# Patient Record
Sex: MALE | Race: BLACK OR AFRICAN AMERICAN | NOT HISPANIC OR LATINO | Employment: OTHER | URBAN - METROPOLITAN AREA
[De-identification: names, ages, dates, MRNs, and addresses within clinical notes are randomized per-mention and may not be internally consistent; named-entity substitution may affect disease eponyms.]

---

## 2017-11-10 ENCOUNTER — HOSPITAL ENCOUNTER (OUTPATIENT)
Dept: CARDIOLOGY | Facility: HOSPITAL | Age: 62
Discharge: HOME | End: 2017-11-10
Attending: ORTHOPAEDIC SURGERY | Admitting: ORTHOPAEDIC SURGERY

## 2017-11-10 ENCOUNTER — TRANSCRIBE ORDERS (OUTPATIENT)
Dept: ADMINISTRATIVE | Facility: HOSPITAL | Age: 62
End: 2017-11-10

## 2017-11-10 ENCOUNTER — LAB (OUTPATIENT)
Dept: LAB | Facility: HOSPITAL | Age: 62
End: 2017-11-10

## 2017-11-10 DIAGNOSIS — S83.206A ACUTE MENISCAL TEAR OF KNEE, RIGHT, INITIAL ENCOUNTER: ICD-10-CM

## 2017-11-10 DIAGNOSIS — Z01.811 PRE-OP CHEST EXAM: Primary | ICD-10-CM

## 2017-11-10 DIAGNOSIS — S83.206A ACUTE MENISCAL TEAR OF KNEE, RIGHT, INITIAL ENCOUNTER: Primary | ICD-10-CM

## 2017-11-10 DIAGNOSIS — Z01.811 PRE-OP CHEST EXAM: ICD-10-CM

## 2017-11-10 LAB
ALBUMIN SERPL-MCNC: 4.3 G/DL (ref 3.5–5.2)
ALBUMIN/GLOB SERPL: 1.1 G/DL
ALP SERPL-CCNC: 96 U/L (ref 39–117)
ALT SERPL W P-5'-P-CCNC: 16 U/L (ref 1–41)
ANION GAP SERPL CALCULATED.3IONS-SCNC: 14.9 MMOL/L
AST SERPL-CCNC: 16 U/L (ref 1–40)
BASOPHILS # BLD AUTO: 0.06 10*3/MM3 (ref 0–0.2)
BASOPHILS NFR BLD AUTO: 0.8 % (ref 0–1.5)
BILIRUB SERPL-MCNC: 0.4 MG/DL (ref 0.1–1.2)
BUN BLD-MCNC: 9 MG/DL (ref 8–23)
BUN/CREAT SERPL: 10.6 (ref 7–25)
CALCIUM SPEC-SCNC: 9.3 MG/DL (ref 8.6–10.5)
CHLORIDE SERPL-SCNC: 101 MMOL/L (ref 98–107)
CO2 SERPL-SCNC: 24.1 MMOL/L (ref 22–29)
CREAT BLD-MCNC: 0.85 MG/DL (ref 0.76–1.27)
DEPRECATED RDW RBC AUTO: 47.4 FL (ref 37–54)
EOSINOPHIL # BLD AUTO: 0.28 10*3/MM3 (ref 0–0.7)
EOSINOPHIL NFR BLD AUTO: 3.5 % (ref 0.3–6.2)
ERYTHROCYTE [DISTWIDTH] IN BLOOD BY AUTOMATED COUNT: 15.4 % (ref 11.5–14.5)
GFR SERPL CREATININE-BSD FRML MDRD: 111 ML/MIN/1.73
GLOBULIN UR ELPH-MCNC: 3.9 GM/DL
GLUCOSE BLD-MCNC: 228 MG/DL (ref 65–99)
HCT VFR BLD AUTO: 43.7 % (ref 40.4–52.2)
HGB BLD-MCNC: 14.3 G/DL (ref 13.7–17.6)
IMM GRANULOCYTES # BLD: 0.03 10*3/MM3 (ref 0–0.03)
IMM GRANULOCYTES NFR BLD: 0.4 % (ref 0–0.5)
LYMPHOCYTES # BLD AUTO: 2.24 10*3/MM3 (ref 0.9–4.8)
LYMPHOCYTES NFR BLD AUTO: 28.1 % (ref 19.6–45.3)
MCH RBC QN AUTO: 27.6 PG (ref 27–32.7)
MCHC RBC AUTO-ENTMCNC: 32.7 G/DL (ref 32.6–36.4)
MCV RBC AUTO: 84.2 FL (ref 79.8–96.2)
MONOCYTES # BLD AUTO: 0.49 10*3/MM3 (ref 0.2–1.2)
MONOCYTES NFR BLD AUTO: 6.1 % (ref 5–12)
NEUTROPHILS # BLD AUTO: 4.88 10*3/MM3 (ref 1.9–8.1)
NEUTROPHILS NFR BLD AUTO: 61.1 % (ref 42.7–76)
PLATELET # BLD AUTO: 319 10*3/MM3 (ref 140–500)
PMV BLD AUTO: 10.3 FL (ref 6–12)
POTASSIUM BLD-SCNC: 4.1 MMOL/L (ref 3.5–5.2)
PROT SERPL-MCNC: 8.2 G/DL (ref 6–8.5)
RBC # BLD AUTO: 5.19 10*6/MM3 (ref 4.6–6)
SODIUM BLD-SCNC: 140 MMOL/L (ref 136–145)
WBC NRBC COR # BLD: 7.98 10*3/MM3 (ref 4.5–10.7)

## 2017-11-10 PROCEDURE — 85025 COMPLETE CBC W/AUTO DIFF WBC: CPT

## 2017-11-10 PROCEDURE — 80053 COMPREHEN METABOLIC PANEL: CPT

## 2017-11-10 PROCEDURE — 93005 ELECTROCARDIOGRAM TRACING: CPT | Performed by: ORTHOPAEDIC SURGERY

## 2017-11-10 PROCEDURE — 93010 ELECTROCARDIOGRAM REPORT: CPT | Performed by: INTERNAL MEDICINE

## 2017-11-10 PROCEDURE — 36415 COLL VENOUS BLD VENIPUNCTURE: CPT

## 2018-08-01 ENCOUNTER — HOSPITAL ENCOUNTER (EMERGENCY)
Facility: HOSPITAL | Age: 63
Discharge: PSYCHIATRIC OR SUBSTANCE USE FACILITY | End: 2018-08-02
Attending: EMERGENCY MEDICINE | Admitting: EMERGENCY MEDICINE

## 2018-08-01 DIAGNOSIS — R45.851 SUICIDAL IDEATIONS: ICD-10-CM

## 2018-08-01 DIAGNOSIS — F32.A DEPRESSION, UNSPECIFIED DEPRESSION TYPE: Primary | ICD-10-CM

## 2018-08-01 DIAGNOSIS — F10.920 ALCOHOLIC INTOXICATION WITHOUT COMPLICATION (HCC): ICD-10-CM

## 2018-08-01 PROCEDURE — 99284 EMERGENCY DEPT VISIT MOD MDM: CPT

## 2018-08-01 RX ORDER — LISINOPRIL 5 MG/1
5 TABLET ORAL DAILY
COMMUNITY
Start: 2017-05-22

## 2018-08-01 RX ORDER — METOPROLOL TARTRATE 50 MG/1
50 TABLET, FILM COATED ORAL EVERY 12 HOURS SCHEDULED
COMMUNITY

## 2018-08-01 RX ORDER — GABAPENTIN 800 MG/1
800 TABLET ORAL 3 TIMES DAILY
COMMUNITY

## 2018-08-01 RX ORDER — INSULIN GLARGINE 100 [IU]/ML
10 INJECTION, SOLUTION SUBCUTANEOUS NIGHTLY
COMMUNITY
Start: 2017-04-25

## 2018-08-01 RX ORDER — HYDROCHLOROTHIAZIDE 25 MG/1
12.5 TABLET ORAL 2 TIMES DAILY
COMMUNITY

## 2018-08-01 RX ORDER — CLOPIDOGREL BISULFATE 75 MG/1
75 TABLET ORAL DAILY
COMMUNITY
Start: 2017-04-26

## 2018-08-01 RX ORDER — AMLODIPINE BESYLATE 10 MG/1
10 TABLET ORAL DAILY
COMMUNITY

## 2018-08-01 RX ORDER — NITROGLYCERIN 0.4 MG/1
0.4 TABLET SUBLINGUAL
COMMUNITY
Start: 2017-04-25

## 2018-08-01 RX ORDER — ISOSORBIDE MONONITRATE 30 MG/1
30 TABLET, EXTENDED RELEASE ORAL DAILY
COMMUNITY

## 2018-08-02 ENCOUNTER — HOSPITAL ENCOUNTER (INPATIENT)
Facility: HOSPITAL | Age: 63
LOS: 4 days | Discharge: HOME | End: 2018-08-06
Attending: SPECIALIST | Admitting: SPECIALIST

## 2018-08-02 ENCOUNTER — APPOINTMENT (OUTPATIENT)
Dept: GENERAL RADIOLOGY | Facility: HOSPITAL | Age: 63
End: 2018-08-02
Attending: INTERNAL MEDICINE

## 2018-08-02 VITALS
BODY MASS INDEX: 27.28 KG/M2 | RESPIRATION RATE: 16 BRPM | HEIGHT: 68 IN | SYSTOLIC BLOOD PRESSURE: 135 MMHG | OXYGEN SATURATION: 98 % | DIASTOLIC BLOOD PRESSURE: 95 MMHG | TEMPERATURE: 98.7 F | WEIGHT: 180 LBS | HEART RATE: 95 BPM

## 2018-08-02 PROBLEM — R45.851 DEPRESSION WITH SUICIDAL IDEATION: Status: ACTIVE | Noted: 2018-08-02

## 2018-08-02 PROBLEM — F32.A DEPRESSION WITH SUICIDAL IDEATION: Status: ACTIVE | Noted: 2018-08-02

## 2018-08-02 LAB
ALBUMIN SERPL-MCNC: 4.4 G/DL (ref 3.5–5.2)
ALBUMIN/GLOB SERPL: 1.4 G/DL
ALP SERPL-CCNC: 90 U/L (ref 39–117)
ALT SERPL W P-5'-P-CCNC: 20 U/L (ref 1–41)
AMPHET+METHAMPHET UR QL: NEGATIVE
ANION GAP SERPL CALCULATED.3IONS-SCNC: 21.5 MMOL/L
AST SERPL-CCNC: 19 U/L (ref 1–40)
BARBITURATES UR QL SCN: NEGATIVE
BASOPHILS # BLD AUTO: 0.03 10*3/MM3 (ref 0–0.2)
BASOPHILS NFR BLD AUTO: 0.4 % (ref 0–1.5)
BENZODIAZ UR QL SCN: NEGATIVE
BILIRUB SERPL-MCNC: 0.2 MG/DL (ref 0.1–1.2)
BUN BLD-MCNC: 13 MG/DL (ref 8–23)
BUN/CREAT SERPL: 13 (ref 7–25)
CALCIUM SPEC-SCNC: 9.3 MG/DL (ref 8.6–10.5)
CANNABINOIDS SERPL QL: NEGATIVE
CHLORIDE SERPL-SCNC: 97 MMOL/L (ref 98–107)
CO2 SERPL-SCNC: 19.5 MMOL/L (ref 22–29)
COCAINE UR QL: NEGATIVE
CREAT BLD-MCNC: 1 MG/DL (ref 0.76–1.27)
DEPRECATED RDW RBC AUTO: 45.5 FL (ref 37–54)
EOSINOPHIL # BLD AUTO: 0.25 10*3/MM3 (ref 0–0.7)
EOSINOPHIL NFR BLD AUTO: 3.2 % (ref 0.3–6.2)
ERYTHROCYTE [DISTWIDTH] IN BLOOD BY AUTOMATED COUNT: 14.8 % (ref 11.5–14.5)
ETHANOL BLD-MCNC: 141 MG/DL (ref 0–10)
ETHANOL BLD-MCNC: <10 MG/DL (ref 0–10)
ETHANOL UR QL: 0.14 %
ETHANOL UR QL: <0.01 %
GFR SERPL CREATININE-BSD FRML MDRD: 92 ML/MIN/1.73
GLOBULIN UR ELPH-MCNC: 3.2 GM/DL
GLUCOSE BLD-MCNC: 329 MG/DL (ref 65–99)
GLUCOSE BLDC GLUCOMTR-MCNC: 232 MG/DL (ref 70–130)
GLUCOSE BLDC GLUCOMTR-MCNC: 236 MG/DL (ref 70–130)
GLUCOSE BLDC GLUCOMTR-MCNC: 239 MG/DL (ref 70–130)
GLUCOSE BLDC GLUCOMTR-MCNC: 248 MG/DL (ref 70–130)
GLUCOSE BLDC GLUCOMTR-MCNC: 248 MG/DL (ref 70–130)
GLUCOSE BLDC GLUCOMTR-MCNC: 304 MG/DL (ref 70–130)
HCT VFR BLD AUTO: 46.3 % (ref 40.4–52.2)
HGB BLD-MCNC: 14.3 G/DL (ref 13.7–17.6)
IMM GRANULOCYTES # BLD: 0 10*3/MM3 (ref 0–0.03)
IMM GRANULOCYTES NFR BLD: 0 % (ref 0–0.5)
LYMPHOCYTES # BLD AUTO: 2.21 10*3/MM3 (ref 0.9–4.8)
LYMPHOCYTES NFR BLD AUTO: 28.3 % (ref 19.6–45.3)
MCH RBC QN AUTO: 26 PG (ref 27–32.7)
MCHC RBC AUTO-ENTMCNC: 30.9 G/DL (ref 32.6–36.4)
MCV RBC AUTO: 84.3 FL (ref 79.8–96.2)
METHADONE UR QL SCN: NEGATIVE
MONOCYTES # BLD AUTO: 0.6 10*3/MM3 (ref 0.2–1.2)
MONOCYTES NFR BLD AUTO: 7.7 % (ref 5–12)
NEUTROPHILS # BLD AUTO: 4.73 10*3/MM3 (ref 1.9–8.1)
NEUTROPHILS NFR BLD AUTO: 60.4 % (ref 42.7–76)
OPIATES UR QL: NEGATIVE
OXYCODONE UR QL SCN: NEGATIVE
PLATELET # BLD AUTO: 232 10*3/MM3 (ref 140–500)
PMV BLD AUTO: 10.9 FL (ref 6–12)
POTASSIUM BLD-SCNC: 4 MMOL/L (ref 3.5–5.2)
PROT SERPL-MCNC: 7.6 G/DL (ref 6–8.5)
RBC # BLD AUTO: 5.49 10*6/MM3 (ref 4.6–6)
SODIUM BLD-SCNC: 138 MMOL/L (ref 136–145)
T4 FREE SERPL-MCNC: 1.32 NG/DL (ref 0.93–1.7)
TSH SERPL DL<=0.05 MIU/L-ACNC: 2.97 MIU/ML (ref 0.27–4.2)
WBC NRBC COR # BLD: 7.82 10*3/MM3 (ref 4.5–10.7)

## 2018-08-02 PROCEDURE — 82962 GLUCOSE BLOOD TEST: CPT

## 2018-08-02 PROCEDURE — 84439 ASSAY OF FREE THYROXINE: CPT | Performed by: SPECIALIST

## 2018-08-02 PROCEDURE — 80307 DRUG TEST PRSMV CHEM ANLYZR: CPT | Performed by: EMERGENCY MEDICINE

## 2018-08-02 PROCEDURE — 80053 COMPREHEN METABOLIC PANEL: CPT | Performed by: SPECIALIST

## 2018-08-02 PROCEDURE — 63710000001 INSULIN ASPART PER 5 UNITS: Performed by: EMERGENCY MEDICINE

## 2018-08-02 PROCEDURE — 73502 X-RAY EXAM HIP UNI 2-3 VIEWS: CPT

## 2018-08-02 PROCEDURE — 63710000001 INSULIN DETEMIR PER 5 UNITS: Performed by: INTERNAL MEDICINE

## 2018-08-02 PROCEDURE — 63710000001 INSULIN ASPART PER 5 UNITS: Performed by: SPECIALIST

## 2018-08-02 PROCEDURE — 85025 COMPLETE CBC W/AUTO DIFF WBC: CPT | Performed by: SPECIALIST

## 2018-08-02 PROCEDURE — 84443 ASSAY THYROID STIM HORMONE: CPT | Performed by: SPECIALIST

## 2018-08-02 RX ORDER — LISINOPRIL 5 MG/1
5 TABLET ORAL
Status: DISPENSED | OUTPATIENT
Start: 2018-08-02 | End: 2018-08-03

## 2018-08-02 RX ORDER — AMLODIPINE BESYLATE 10 MG/1
10 TABLET ORAL NIGHTLY
Status: COMPLETED | OUTPATIENT
Start: 2018-08-02 | End: 2018-08-02

## 2018-08-02 RX ORDER — ASPIRIN 81 MG/1
81 TABLET, CHEWABLE ORAL DAILY
Status: DISCONTINUED | OUTPATIENT
Start: 2018-08-02 | End: 2018-08-03

## 2018-08-02 RX ORDER — PRAVASTATIN SODIUM 40 MG
40 TABLET ORAL 2 TIMES DAILY
COMMUNITY
End: 2018-08-06 | Stop reason: HOSPADM

## 2018-08-02 RX ORDER — METOPROLOL TARTRATE 50 MG/1
50 TABLET, FILM COATED ORAL EVERY 12 HOURS SCHEDULED
Status: DISPENSED | OUTPATIENT
Start: 2018-08-02 | End: 2018-08-03

## 2018-08-02 RX ORDER — DEXTROSE MONOHYDRATE 25 G/50ML
25 INJECTION, SOLUTION INTRAVENOUS
Status: DISCONTINUED | OUTPATIENT
Start: 2018-08-02 | End: 2018-08-06 | Stop reason: HOSPADM

## 2018-08-02 RX ORDER — ARIPIPRAZOLE 10 MG/1
10 TABLET ORAL DAILY
COMMUNITY
End: 2018-08-06 | Stop reason: HOSPADM

## 2018-08-02 RX ORDER — ARIPIPRAZOLE 10 MG/1
10 TABLET ORAL DAILY
Status: COMPLETED | OUTPATIENT
Start: 2018-08-02 | End: 2018-08-03

## 2018-08-02 RX ORDER — NICOTINE POLACRILEX 4 MG
15 LOZENGE BUCCAL
Status: DISCONTINUED | OUTPATIENT
Start: 2018-08-02 | End: 2018-08-06 | Stop reason: HOSPADM

## 2018-08-02 RX ORDER — GABAPENTIN 400 MG/1
800 CAPSULE ORAL 3 TIMES DAILY
Status: COMPLETED | OUTPATIENT
Start: 2018-08-02 | End: 2018-08-03

## 2018-08-02 RX ORDER — ACETAMINOPHEN 325 MG/1
650 TABLET ORAL EVERY 4 HOURS PRN
Status: DISCONTINUED | OUTPATIENT
Start: 2018-08-02 | End: 2018-08-06 | Stop reason: HOSPADM

## 2018-08-02 RX ORDER — NAPROXEN 500 MG/1
500 TABLET ORAL 2 TIMES DAILY WITH MEALS
Status: DISCONTINUED | OUTPATIENT
Start: 2018-08-02 | End: 2018-08-06 | Stop reason: HOSPADM

## 2018-08-02 RX ORDER — ALUMINA, MAGNESIA, AND SIMETHICONE 2400; 2400; 240 MG/30ML; MG/30ML; MG/30ML
15 SUSPENSION ORAL EVERY 6 HOURS PRN
Status: DISCONTINUED | OUTPATIENT
Start: 2018-08-02 | End: 2018-08-06 | Stop reason: HOSPADM

## 2018-08-02 RX ORDER — CLONIDINE HYDROCHLORIDE 0.1 MG/1
0.1 TABLET ORAL ONCE
Status: COMPLETED | OUTPATIENT
Start: 2018-08-02 | End: 2018-08-02

## 2018-08-02 RX ORDER — CHLORDIAZEPOXIDE HYDROCHLORIDE 25 MG/1
25 CAPSULE, GELATIN COATED ORAL ONCE
Status: COMPLETED | OUTPATIENT
Start: 2018-08-02 | End: 2018-08-02

## 2018-08-02 RX ORDER — HYDROCHLOROTHIAZIDE 12.5 MG/1
12.5 CAPSULE, GELATIN COATED ORAL 2 TIMES DAILY
Status: DISPENSED | OUTPATIENT
Start: 2018-08-02 | End: 2018-08-03

## 2018-08-02 RX ORDER — ISOSORBIDE MONONITRATE 30 MG/1
30 TABLET, EXTENDED RELEASE ORAL NIGHTLY
Status: COMPLETED | OUTPATIENT
Start: 2018-08-02 | End: 2018-08-02

## 2018-08-02 RX ORDER — CLOPIDOGREL BISULFATE 75 MG/1
75 TABLET ORAL DAILY
Status: DISCONTINUED | OUTPATIENT
Start: 2018-08-02 | End: 2018-08-03

## 2018-08-02 RX ADMIN — INSULIN ASPART 5 UNITS: 100 INJECTION, SOLUTION INTRAVENOUS; SUBCUTANEOUS at 00:11

## 2018-08-02 RX ADMIN — INSULIN DETEMIR 15 UNITS: 100 INJECTION, SOLUTION SUBCUTANEOUS at 23:10

## 2018-08-02 RX ADMIN — CLONIDINE HYDROCHLORIDE 0.1 MG: 0.1 TABLET ORAL at 06:35

## 2018-08-02 RX ADMIN — AMLODIPINE BESYLATE 10 MG: 10 TABLET ORAL at 21:22

## 2018-08-02 RX ADMIN — NAPROXEN 500 MG: 500 TABLET ORAL at 21:21

## 2018-08-02 RX ADMIN — ISOSORBIDE MONONITRATE 30 MG: 30 TABLET ORAL at 21:22

## 2018-08-02 RX ADMIN — CHLORDIAZEPOXIDE HYDROCHLORIDE 25 MG: 25 CAPSULE ORAL at 06:35

## 2018-08-02 RX ADMIN — ARIPIPRAZOLE 10 MG: 10 TABLET ORAL at 15:10

## 2018-08-02 RX ADMIN — GABAPENTIN 800 MG: 400 CAPSULE ORAL at 15:09

## 2018-08-02 RX ADMIN — METOPROLOL TARTRATE 50 MG: 50 TABLET, FILM COATED ORAL at 21:21

## 2018-08-02 RX ADMIN — GABAPENTIN 800 MG: 400 CAPSULE ORAL at 21:22

## 2018-08-02 RX ADMIN — INSULIN ASPART 5 UNITS: 100 INJECTION, SOLUTION INTRAVENOUS; SUBCUTANEOUS at 17:22

## 2018-08-02 RX ADMIN — INSULIN ASPART 7 UNITS: 100 INJECTION, SOLUTION INTRAVENOUS; SUBCUTANEOUS at 11:02

## 2018-08-02 NOTE — ED NOTES
PT resting comfortably with sitter at bedside. Cafeteria contacted about breakfast tray.      Aniyah Garrett RN  08/02/18 5859

## 2018-08-02 NOTE — ED NOTES
Accu-chek completed. No Change: Glucose 248mg/dL. Nurse Cristiane Haque notified       Isidro Frankel  08/02/18 0103

## 2018-08-02 NOTE — CONSULTS
Inpatient Hospitalist Consult  Consult performed by: JE HENSON  Consult ordered by: ROBERT NICE III        Date of Admit: 8/2/2018  Date of Consult: 08/03/18    Requesting Physician: Dr. Nice    Reason for Consult: Medical evaluation contributing to current psychiatric illness and management of diabetes and hypertension    Patient is a 63 y.o. male admitted to the Crisis Management Unit for further evaluation regarding depression and suicidal ideation.  We were asked to see and evaluate his medical issues as they may pertain to his current psychiatric illness.  Patient currently denies any chest pain, shortness of breath, nausea or vomiting.  No recent illnesses.  Sugars have been high, in the mid 200s.  No polyuria or polydipsia.  He has gained about 15 pounds in the last few months.  No paresthesias.  No dizziness or lightheadedness.  No other associated symptoms or exacerbating or alleviating factors    Past Medical History:   Diagnosis Date   • Bipolar 1 disorder (CMS/HCC)    • Hyperlipidemia    • Hypertension    • Myocardial infarction    • Stroke (CMS/HCC)     DM2    Past Surgical History:   Procedure Laterality Date   • CARDIAC SURGERY     • KNEE ARTHROPLASTY         Prescriptions Prior to Admission   Medication Sig Dispense Refill Last Dose   • amLODIPine (NORVASC) 10 MG tablet Take 10 mg by mouth Daily.   Past Week at Unknown time   • ARIPiprazole (ABILIFY) 10 MG tablet Take 10 mg by mouth Daily.   Past Week at Unknown time   • aspirin 81 MG tablet Take 81 mg by mouth Daily.   Past Week at Unknown time   • clopidogrel (PLAVIX) 75 MG tablet Take 75 mg by mouth Daily.   Past Week at Unknown time   • gabapentin (NEURONTIN) 800 MG tablet Take 800 mg by mouth 3 (Three) Times a Day.   Past Week at Unknown time   • hydrochlorothiazide (HYDRODIURIL) 25 MG tablet Take 12.5 mg by mouth 2 (Two) Times a Day.   Past Week at Unknown time   • insulin aspart (novoLOG) 100 UNIT/ML injection  Inject 5 Units under the skin into the appropriate area as directed 3 (Three) Times a Day Before Meals. Blood glucose less than 150, hold that dose of insulin, takes 5 units if blood glucose is 150-200; if above 200 takes 6 units, then adds 1 additional unit for each 50 point increase in BG.   Past Week at Unknown time   • insulin glargine (LANTUS) 100 UNIT/ML injection Inject 10 Units under the skin into the appropriate area as directed Every Night.   Past Week at Unknown time   • isosorbide mononitrate (IMDUR) 30 MG 24 hr tablet Take 30 mg by mouth Daily.   Past Week at Unknown time   • lisinopril (PRINIVIL,ZESTRIL) 5 MG tablet Take 5 mg by mouth Daily.   Past Week at Unknown time   • metoprolol tartrate (LOPRESSOR) 50 MG tablet Take 50 mg by mouth Every 12 (Twelve) Hours.   Past Week at Unknown time   • nitroglycerin (NITROSTAT) 0.4 MG SL tablet Place 0.4 mg under the tongue Every 5 (Five) Minutes As Needed for Chest Pain.   More than a month at Unknown time   • pravastatin (PRAVACHOL) 40 MG tablet Take 40 mg by mouth 2 (Two) Times a Day.        Allergies:  Ticagrelor; Statins; Carvedilol; and Hydralazine    Social History   Substance Use Topics   • Smoking status: Current Every Day Smoker     Packs/day: 0.50   • Smokeless tobacco: Not on file   • Alcohol use 14.4 oz/week     24 Cans of beer per week      Comment: daily       Family history  Positive for diabetes, heart disease, stroke, hypertension    Review of Systems   Constitutional: Positive for unexpected weight change. Negative for activity change, appetite change, chills, fatigue and fever.   HENT: Negative for congestion and sore throat.    Eyes: Negative for visual disturbance.   Respiratory: Negative for cough, choking, shortness of breath and wheezing.    Cardiovascular: Negative for chest pain, palpitations and leg swelling.   Gastrointestinal: Negative for abdominal distention, constipation, diarrhea and vomiting.   Endocrine: Negative for  polydipsia, polyphagia and polyuria.   Genitourinary: Negative for difficulty urinating and dysuria.   Musculoskeletal: Positive for arthralgias.        Left hip has been hurting for the last 2 months.  It's constant.  Sometimes it gets better with heat.  It's very severe when he first wakes up in the morning.  Walking doesn't seem to make it any worse.   Skin: Negative for rash and wound.   Allergic/Immunologic: Negative for environmental allergies.   Neurological: Negative for dizziness, weakness and numbness.   Psychiatric/Behavioral: Positive for dysphoric mood and suicidal ideas.          Vital Signs  Temp:  [97.7 °F (36.5 °C)] 97.7 °F (36.5 °C)  Heart Rate:  [79] 79  Resp:  [20] 20  BP: (141)/(103) 141/103  Body mass index is 27.37 kg/m².    Physical Exam   Constitutional: He appears well-developed and well-nourished. No distress.   HENT:   Head: Normocephalic and atraumatic.   Right Ear: External ear normal.   Left Ear: External ear normal.   Nose: Nose normal.   Mouth/Throat: Oropharynx is clear and moist. No oropharyngeal exudate.   Eyes: Pupils are equal, round, and reactive to light. Conjunctivae and EOM are normal. Right eye exhibits no discharge. Left eye exhibits no discharge. No scleral icterus.   Neck: Normal range of motion. Neck supple. No JVD present. No tracheal deviation present. No thyromegaly present.   Cardiovascular: Normal rate, regular rhythm, normal heart sounds and intact distal pulses.    No murmur heard.  Pulmonary/Chest: Effort normal and breath sounds normal. No stridor. No respiratory distress. He has no wheezes. He has no rales.   Abdominal: Soft. Bowel sounds are normal. He exhibits no distension. There is no tenderness.   No hepatosplenomegaly   Musculoskeletal: He exhibits no edema or deformity.   Mild tenderness at left lateral hip.  Some decreased internal and external rotation left hip   Lymphadenopathy:     He has no cervical adenopathy.   Neurological: He is alert. A  sensory deficit is present. No cranial nerve deficit.   Hypesthesia distal lower extremities   Skin: Skin is warm and dry. He is not diaphoretic. No erythema.   Psychiatric: His behavior is normal.   Flat affect   Nursing note and vitals reviewed.      Results Review:    I reviewed the patient's new clinical results.    Lab Results   Component Value Date    GLUCOSE 329 (H) 08/02/2018    BUN 13 08/02/2018    CREATININE 1.00 08/02/2018    EGFRIFAFRI 92 08/02/2018    BCR 13.0 08/02/2018    K 4.0 08/02/2018    CO2 19.5 (L) 08/02/2018    CALCIUM 9.3 08/02/2018    ALBUMIN 4.40 08/02/2018    AST 19 08/02/2018    ALT 20 08/02/2018       Lab Results   Component Value Date    WBC 7.82 08/02/2018    HGB 14.3 08/02/2018    HCT 46.3 08/02/2018    MCV 84.3 08/02/2018     08/02/2018             Imaging Results (last 24 hours)     Procedure Component Value Units Date/Time    XR Hip With or Without Pelvis 2 - 3 View Left [[id number]] Collected:  08/03/18 0542     Updated:  08/03/18 0545    Narrative:       THREE-VIEW LEFT HIP     HISTORY: left hip pain     FINDINGS: Three views of the left hip were submitted. There is no  fracture or dislocation. There are multiple small pelvic calcifications  present.  These likely reflect phleboliths.  If there is concern  otherwise or suspicion of ureteral calculus, CT imaging may be  indicated..             Impression:       1. No acute osseous abnormality.     This report was finalized on 8/3/2018 5:42 AM by Erwin Nichols M.D.               Assessment/Plan:  1.  Diabetes mellitus type 2.  Uncontrolled.  Continue mealtime insulin and Levemir.  Sliding scale as needed.  A1c 9.6 June 2018  2.  Hypertension.  Continue Lopressor.  Also has history of coronary artery disease  3.  Left hip pain: Osteoarthritis versus trochanteric bursitis.  Plain film of the hip ordered.  I ordered Naprosyn to see if he gets some relief.  Ice to the area  4.  History of TIA.  Intolerant of statins.  Continue  aspirin and Plavix  5.  Bipolar disorder with alcoholism, depression and suicidal ideation.  Management per psychiatry    Marisol Subramanian MD  [facility]  08/03/18  10:45 AM

## 2018-08-02 NOTE — ED PROVIDER NOTES
" EMERGENCY DEPARTMENT ENCOUNTER    CHIEF COMPLAINT  Chief Complaint: suicidal ideations  History given by: patient  History limited by: none  Room Number: 22/22  PMD: Hector Araya, APRN      HPI:  Pt is a 63 y.o. male who presents complaining of feelings of depression w/ hx of bipolar depression and previous suicide attempts. Pt advised that he developed sx after a significant financial issues arose. Pt admitted to having thoughts of taking a llarge amount of gabapentin. He contacted the crisis hotline and they sent the police to the house. Denies illicit drug use. Admits to ETOH use (last consumption earlier tonight, \"two beers\"). Pt reports that he feels improved, but still has some vague SI.    Duration: pta  Onset: gradual  Timing: constant  Location: n/a  Radiation: n/a  Quality: SI  Intensity/Severity: moderate  Progression: improved  Associated Symptoms: none  Aggravating Factors: pt reports significant financial burdens resulting in the suspension of his 's license.  Alleviating Factors: none  Previous Episodes: pt has hx of bipolar depression and previous suicide attempts  Treatment before arrival: none    PAST MEDICAL HISTORY  Active Ambulatory Problems     Diagnosis Date Noted   • No Active Ambulatory Problems     Resolved Ambulatory Problems     Diagnosis Date Noted   • No Resolved Ambulatory Problems     Past Medical History:   Diagnosis Date   • Bipolar 1 disorder (CMS/HCC)    • Hyperlipidemia    • Hypertension    • Myocardial infarction    • Stroke (CMS/HCC)        PAST SURGICAL HISTORY  Past Surgical History:   Procedure Laterality Date   • CARDIAC SURGERY     • KNEE ARTHROPLASTY         FAMILY HISTORY  History reviewed. No pertinent family history.    SOCIAL HISTORY  Social History     Social History   • Marital status:      Spouse name: N/A   • Number of children: N/A   • Years of education: N/A     Occupational History   • Not on file.     Social History Main Topics   • Smoking " status: Current Every Day Smoker     Packs/day: 0.50   • Smokeless tobacco: Not on file   • Alcohol use 14.4 oz/week     24 Cans of beer per week      Comment: daily   • Drug use: No   • Sexual activity: Not on file     Other Topics Concern   • Not on file     Social History Narrative   • No narrative on file       ALLERGIES  Ticagrelor; Statins; Carvedilol; and Hydralazine    REVIEW OF SYSTEMS  Review of Systems   Constitutional: Negative for fever.   Respiratory: Negative for shortness of breath.    Cardiovascular: Negative for chest pain.   Psychiatric/Behavioral: Positive for suicidal ideas.       PHYSICAL EXAM  ED Triage Vitals   Temp Heart Rate Resp BP SpO2   -- 08/01/18 2308 08/01/18 2308 08/01/18 2308 08/01/18 2314    94 18 146/100 98 %      Temp src Heart Rate Source Patient Position BP Location FiO2 (%)   -- -- 08/01/18 2308 08/01/18 2308 --     Sitting Right arm        Physical Exam   Constitutional: No distress.   HENT:   Head: Normocephalic and atraumatic.   Eyes: EOM are normal.   Neck: Normal range of motion.   Pulmonary/Chest: No respiratory distress.   Abdominal: There is no tenderness.   Musculoskeletal: He exhibits no edema.   Neurological: He is alert.   Skin: Skin is warm and dry.   Psychiatric:   Pt continues to have some vague SI   Nursing note and vitals reviewed.      LAB RESULTS  Lab Results (last 24 hours)     Procedure Component Value Units Date/Time    POC Glucose Once [[id number]]  (Abnormal) Collected:  08/02/18 0008    Specimen:  Blood Updated:  08/02/18 0009     Glucose 248 (H) mg/dL     Narrative:       Meter: [id number] : [id number] Jostin BENÍTEZ [facility]    Urine Drug Screen - Urine, Clean Catch [[id number]]  (Normal) Collected:  08/02/18 0012    Specimen:  Urine from Urine, Clean Catch Updated:  08/02/18 0047     Amphet/Methamphet, Screen Negative     Barbiturates Screen, Urine Negative     Benzodiazepine Screen, Urine Negative     Cocaine Screen, Urine Negative     Opiate  Screen Negative     THC, Screen, Urine Negative     Methadone Screen, Urine Negative     Oxycodone Screen, Urine Negative    Narrative:       Negative Thresholds For Drugs Screened:     Amphetamines               500 ng/ml   Barbiturates               200 ng/ml   Benzodiazepines            100 ng/ml   Cocaine                    300 ng/ml   Methadone                  300 ng/ml   Opiates                    300 ng/ml   Oxycodone                  100 ng/ml   THC                        50 ng/ml    The Normal Value for all drugs tested is negative. This report includes final unconfirmed screening results to be used for medical treatment purposes only. Unconfirmed results must not be used for non-medical purposes such as employment or legal testing. Clinical consideration should be applied to any drug of abuse test, particulary when unconfirmed results are used.    Ethanol [[id number]]  (Abnormal) Collected:  08/02/18 0025    Specimen:  Blood from Arm, Right Updated:  08/02/18 0050     Ethanol 141 (H) mg/dL      Ethanol % 0.141 %     POC Glucose Once [[id number]]  (Abnormal) Collected:  08/02/18 0059    Specimen:  Blood Updated:  08/02/18 0101     Glucose 248 (H) mg/dL     Narrative:       Meter: [id number] : [id number] Jostin [facility]    POC Glucose Once [[id number]]  (Abnormal) Collected:  08/02/18 0304    Specimen:  Blood Updated:  08/02/18 0316     Glucose 239 (H) mg/dL     Narrative:       Meter: [id number] : [id number] Roque GALE          I ordered the above labs and reviewed the results    PROCEDURES  Procedures      PROGRESS AND CONSULTS     0100  Discussed the pt with Elba Eng RN. He will wait until 0200 to assess the pt due to ETOH level.    0321  Discussed pt's behavioral health evaluation w/ Elba Eng RN. After speaking to the pt and his wife, he is concerned for the pt and will find placement/admission.Dr. Sevilla will take over his care in the emergency department until placement is  found.    MEDICAL DECISION MAKING  Results were reviewed/discussed with the patient and they were also made aware of online access. Pt also made aware that some labs, such as cultures, will not be resulted during ER visit and follow up with PMD is necessary.     MDM  Number of Diagnoses or Management Options  Alcoholic intoxication without complication (CMS/HCC):   Depression, unspecified depression type:   Suicidal ideations:      Amount and/or Complexity of Data Reviewed  Clinical lab tests: reviewed (UDS-negative, ETOH 141)  Discuss the patient with other providers: yes (Elba Eng RN)    Patient Progress  Patient progress: stable         DIAGNOSIS  Final diagnoses:   Depression, unspecified depression type   Suicidal ideations   Alcoholic intoxication without complication (CMS/HCC)       DISPOSITION  ADMISSION    Discussed treatment plan and reason for admission with pt/family and admitting physician.  Pt/family voiced understanding of the plan for admission for further testing/treatment as needed.     Latest Documented Vital Signs:  As of 3:42 AM  BP- 146/100 HR- 94 Temp- 97.5 °F (36.4 °C) (Tympanic) O2 sat- 98%    --  Documentation assistance provided by jesika Schaffer for Dr. Gilmore.  Information recorded by the scribe was done at my direction and has been verified and validated by me.     Tamara Schaffer  08/02/18 9532       Ajit Gilmore MD  08/02/18 4132

## 2018-08-02 NOTE — PLAN OF CARE
"Problem: Patient Care Overview  Goal: Plan of Care Review  Outcome: Ongoing (interventions implemented as appropriate)   08/02/18 1245 08/02/18 1624   OTHER   Outcome Summary --  Pt arrived on unit at 1239. He denies SI at this time, but remains 1:1 with SP until seen by MD in the morning. He is irritable, not wanting to participate in assessment. He says to himself, \"this is bullshit\" and \"I never should have come here\". He didn't want to take his BP meds r/t them being offered in the afternoon once he arrived on the unit, even though his blood pressure was 141/100 at the time. He was compliant with his neurotin, abilify and accucheck. He has stayed to self and in room for most of shift. Will continue to monitor pt for safety and any change in condition.   Plan of Care Review   Progress --  no change   Coping/Psychosocial   Plan of Care Reviewed With patient --    Coping/Psychosocial   Patient Agreement with Plan of Care disagrees (describe)  (I shouldnt be here) --        Problem: Overarching Goals (Adult)  Goal: Adheres to Safety Considerations for Self and Others  Outcome: Ongoing (interventions implemented as appropriate)   08/02/18 1624   Overarching Goals (Adult)   Adheres to Safety Considerations for Self and Others making progress toward outcome     Intervention: Develop and Maintain Individualized Safety Plan   08/02/18 1245 08/02/18 1600   C-SSRS (Recent)   Wish to be Dead yes --    Suicidal Thoughts yes --    Suicidal Thought with Method No Plan/Intent yes --    Suicidal Intent (without Specific Plan) no --    Suicide Intent with Specific Plan yes --    Describe Plan (Suicide Intent) no --    Suicide Behavior yes --    Describe Actions (Suicidal Behavior) within the last three months --    Violence Risk   Feels Like Hurting Others no --    Previous Attempt to Harm Others no --    Develop and Maintain Individualized Safety Plan   Safety Measures --  safety rounds completed       Goal: Optimized Coping " Skills in Response to Life Stressors  Outcome: Ongoing (interventions implemented as appropriate)   08/02/18 1624   Overarching Goals (Adult)   Optimized Coping Skills in Response to Life Stressors making progress toward outcome     Intervention: Promote Effective Coping Strategies   08/02/18 1624   Coping/Psychosocial Interventions   Supportive Measures active listening utilized;positive reinforcement provided;verbalization of feelings encouraged;self-responsibility promoted       Goal: Develops/Participates in Therapeutic [place] to Support Successful Transition  Outcome: Ongoing (interventions implemented as appropriate)   08/02/18 1624   Overarching Goals (Adult)   Develops/Participates in Therapeutic [place] to Support Successful Transition making progress toward outcome     Intervention: Foster Therapeutic [place]   08/02/18 1245   Interventions   Trust Relationship/Rapport care explained;choices provided;thoughts/feelings acknowledged;reassurance provided;questions encouraged     Intervention: Mutually Develop Transition Plan   08/02/18 1245   Mutually Develop Transition Plan   Transition Support crisis management plan promoted         Problem: Suicidal Behavior (Adult)  Goal: Suicidal Behavior is Absent/Minimized/Managed  Outcome: Ongoing (interventions implemented as appropriate)   08/02/18 1624   OTHER   Action Step/Short Term Goal (STG) Established 08/02/18   Suicidal Behavior is Absent/Minimized/Managed   Suicidal Behavior Managed/Minimized Time Frame for Action Step (STG) 4 days   Suicidal Behavior Managed/Minimized Action Step (STG) Outcome making progress toward outcome

## 2018-08-02 NOTE — NURSING NOTE
"Met with pt in ER #22.  Explained that [facility] declined the admission in transfer.  He gives few words in response.  He wants to go home, states he wants to get an  to help him with his tax issue.  He repeatedly states that he \"had a moment' when he called the crisis line.  He wants to know how long he has to stay in the hospital.      Went over medication list with pt, he has stopped taking his meds over \"last few days\" d/t his depression.  He denies SI/HI currently.  He reports no psych providers.      He does test his b.s. at home.  No skin issue, no skin sores or breakdown.   I requested ER staff obtain a breakfast tray.      Pt was told about a possible marital session with a therapist while admitted.  He wasn't sure he wanted to have that session.  He is expressing some frustration with his wife.      Seeing if there are discharges this a.m. On [facility] before next steps.  D/w Dr Almanzar and JOHANA Bolden in ER.    "

## 2018-08-02 NOTE — ED NOTES
Accu-chek completed: Glucose 248mg/dL. Nurse Cristiane Haque notified     Isidro Frankel  08/02/18 0037

## 2018-08-02 NOTE — ED NOTES
Reassurance given; call light in reach. Pts breathing even and unlabored. Pt appears in NAD at this time.        Cristiane Haque, RN  08/02/18 0324

## 2018-08-02 NOTE — CONSULTS
"Pt reports that \"I just got overwhelmed, things that are going on in my life, and I started thinking about hurting myself\". Reports he was thinking of \"taking some pills\", reports he was thinking of taking gabapentin as a suicide attempt - goes back to breathing rythmically, and needs multiple prompts to wake up for interview. When asked about other pills he has at home, comments, \"oh god what don't I have\" and states he has BP pills, cholesterol pills, and insulin at home. Shakes head in negation, and states \"no\" when asked if he thought about taking any other his other medications at home when he was thinking of harming himself. Reports he thought about this for \"30 minutes to an hour\" prior to calling crisis line. Asked why he thought about taking his gabapentin and not all of the other medications he has at home, replies, \"it's a controlled substance\" and on f/u states he thought that this meant it would be more likely to harm him (hasn't appeared to have researched this, or to have put a significant amount of thought into it.     Reports he has 2 suicide attempts in his lifetime. Asked when these were, appears to have fallen back asleep, and needs additional verbal prompts. Reports on f/u that his 2 suicide attempts were years ago, states he doesn't remember when.     Pt reports that he had alcohol tonight, but denies taking his night time medications or any other substance PTA.     I started to leave (with plan to return when pt more awake). I discussed this with 1:1 staff, whereupon pt perked up, started watching TV, and states he is ready to keep talking to me now - didn't appear aware that he kept nodding off.     Reports 1 suicide attempt was in his 40's and one was in his 50's. Reports he was admitted to [facility] and [facility] (most recently) after each suicide attempt - to inpatient psych.     Reports he drinks alcohol \"every day\", drinking \"a couple beers\" (22 oz) each day. Denies street " "drug use.     States he thinks that his drinking is a problem for him, and wants to quit, last went to AA 5 or 6 years ago.     Reports [facility]. Reports his only psychotropic medication is his gabapentin 800 mg 3x daily, for anxiety.     Reports he last got treatment for alcohol abuse was years ago, states he doesn't remember how many years ago, has never had a detox admission. Denies hx of seizures or hallucinations when withdrawing.     Asked how long he has been having suicidal thoughts, replies he has been having them for a couple of days, and elaborates, \"that's the first time I really had contemplated a plan tonight\".     Educated about the increased risk of harm behaviors associated with drinking, receptive to this, stating that alcohol \"is a depressant\" appears to have some insight and previous education about this. Asked if he can identify a reason not to try to kill himself: \"no to? Yes ... Number 1, I am a Ph.D student, and my thesis is the role of the black [employer] in providing mental health services in the black community ... There's a lot of undiagnosed mental illness in the black community, partly because of the stigma ... [children of people who commit suicide are] 30% more likely to commit suicide themselves, so I don't want to leave my dtr with that type of stigma. The other thing is, my members would probably freak out, at my lack of jessica. Then there's my loving wife ... She doesn't deserve that ... And the fact that, this too shall pass ... [it's] a permanent solution to a temporary situation.\" Reports he is bipolar, and that the last time he experienced martha was \"maybe a year, a little more\" ago.     Asked if he is safe to home tonight, reports that he thinks that he is an elaborates, \"I've gotten past my crisis, I just want another food tray [laughs] that's a good sign for me, I wanna eat\". Reports he hasn't eaten much " "other than a piece of chicken and a slice of bread in the past 3 days.     Reports he has a tax bill of $8,000 dollars. States he is unsure how he is going to pay this back, \"I need to contact an , I think that's my best option\" (future oriented). Reports he knew he had a bill, \"but I didn't know they would cancel or suspend my 's license\" states the state did this on Monday.     Provides contact information for wife Iman Rose, [phone], and verbal consent to share PHI with wife. States wife is an MD, and on call tonight. Reports that wife was home at time of incident.    Wife reports that \"he will just go back to doing the same thing, because this has been going on for a couple of days. And we had talked about doing inpatient treatmen ... At [facility] ... He will text me suicidal things like 'you'll be better off without me' [and texted her this on Monday night, 2 nights ago]\". Wife reports pt's $8,000 dollar tax bill, and that \"he had promised that he was going to try to get inpatient, like at [facility]. Wife reports that 2 nights ago, pt reported that was thinking of \"doing something like that, suicidal ... We was alluding to, 'I'm sorry for what I'm about to do', and basically saying goodbye\". Wife reports that pt also lost 's license (consistent with pt's statements), and concerned that pt is now home all the time. Wife reports that she is worried about pt, and thinks he needs to be admitted to an inpatient psychiatric unit for safety.     [facility] currently has full census. Plan to seek placement with outside inpatient psychiatric unit. Dr. Gilmore agrees with plan.     Reports he has L hip pain for \"months\", thinks he has arthritis; had L knee surgery in December to repair meniscus. Reports he has HTN, diabetes type II. Reports he has had 3 stents placed in his heart, most recently years ago. Reports he had a stroke, was treated here at [facility], and got TPA about 10 years ago. Reports " "he has had 2 myocardial infarcts, most recently after his stroke, something like 5 or 8 years ago.     Reports that the last time he had chest pain was 2 or 3 years ago.     Asked about wife's statements, reports that he doesn't remember sending a suicidal text to wife on Monday, and that he and wife did not talk about him being admitted to [facility] with the last few days. States of wife, \"she's a doctor, she's worried about everything\". Pt expressing frustration when informed of disposition decision, stating, \"I really don't want to do that\". And stating of wife, \"I need a divorce ... She's not my damn mother\". Pt expressing frustration in appropriate ways. I spoke with Dr. Gilmore about this, who advised that he was placing pt on 72 hour hold.     I called report to Lida at [facility], inpatient psych.                                     "

## 2018-08-02 NOTE — CONSULTS
"Reviewed chart and interviewed pt. Pt was in treatment 5 years ago \"for cocaine\" Pt refused any IRA recommendations or resources.  "

## 2018-08-03 LAB
GLUCOSE BLDC GLUCOMTR-MCNC: 135 MG/DL (ref 70–130)
GLUCOSE BLDC GLUCOMTR-MCNC: 176 MG/DL (ref 70–130)
GLUCOSE BLDC GLUCOMTR-MCNC: 219 MG/DL (ref 70–130)
GLUCOSE BLDC GLUCOMTR-MCNC: 244 MG/DL (ref 70–130)

## 2018-08-03 PROCEDURE — 82962 GLUCOSE BLOOD TEST: CPT

## 2018-08-03 PROCEDURE — 63710000001 INSULIN ASPART PER 5 UNITS: Performed by: INTERNAL MEDICINE

## 2018-08-03 PROCEDURE — 63710000001 INSULIN DETEMIR PER 5 UNITS: Performed by: INTERNAL MEDICINE

## 2018-08-03 RX ORDER — GABAPENTIN 400 MG/1
800 CAPSULE ORAL 3 TIMES DAILY
Status: DISCONTINUED | OUTPATIENT
Start: 2018-08-03 | End: 2018-08-06 | Stop reason: HOSPADM

## 2018-08-03 RX ORDER — BUPROPION HYDROCHLORIDE 150 MG/1
150 TABLET ORAL DAILY
Status: DISCONTINUED | OUTPATIENT
Start: 2018-08-03 | End: 2018-08-06 | Stop reason: HOSPADM

## 2018-08-03 RX ORDER — CLOPIDOGREL BISULFATE 75 MG/1
75 TABLET ORAL DAILY
Status: DISCONTINUED | OUTPATIENT
Start: 2018-08-04 | End: 2018-08-06 | Stop reason: HOSPADM

## 2018-08-03 RX ORDER — ARIPIPRAZOLE 10 MG/1
10 TABLET ORAL DAILY
Status: DISCONTINUED | OUTPATIENT
Start: 2018-08-03 | End: 2018-08-06 | Stop reason: HOSPADM

## 2018-08-03 RX ORDER — AMLODIPINE BESYLATE 10 MG/1
10 TABLET ORAL NIGHTLY
Status: DISCONTINUED | OUTPATIENT
Start: 2018-08-03 | End: 2018-08-06 | Stop reason: HOSPADM

## 2018-08-03 RX ORDER — HYDROCHLOROTHIAZIDE 12.5 MG/1
12.5 CAPSULE, GELATIN COATED ORAL 2 TIMES DAILY
Status: DISCONTINUED | OUTPATIENT
Start: 2018-08-03 | End: 2018-08-03

## 2018-08-03 RX ORDER — METOPROLOL TARTRATE 50 MG/1
50 TABLET, FILM COATED ORAL EVERY 12 HOURS SCHEDULED
Status: DISCONTINUED | OUTPATIENT
Start: 2018-08-03 | End: 2018-08-06 | Stop reason: HOSPADM

## 2018-08-03 RX ORDER — ASPIRIN 81 MG/1
81 TABLET, CHEWABLE ORAL DAILY
Status: DISCONTINUED | OUTPATIENT
Start: 2018-08-04 | End: 2018-08-06 | Stop reason: HOSPADM

## 2018-08-03 RX ORDER — LISINOPRIL 5 MG/1
5 TABLET ORAL
Status: DISPENSED | OUTPATIENT
Start: 2018-08-03 | End: 2018-08-04

## 2018-08-03 RX ORDER — HYDROCHLOROTHIAZIDE 12.5 MG/1
12.5 CAPSULE, GELATIN COATED ORAL DAILY
Status: DISCONTINUED | OUTPATIENT
Start: 2018-08-04 | End: 2018-08-06 | Stop reason: HOSPADM

## 2018-08-03 RX ORDER — ISOSORBIDE MONONITRATE 30 MG/1
30 TABLET, EXTENDED RELEASE ORAL NIGHTLY
Status: DISCONTINUED | OUTPATIENT
Start: 2018-08-03 | End: 2018-08-06 | Stop reason: HOSPADM

## 2018-08-03 RX ADMIN — NAPROXEN 500 MG: 500 TABLET ORAL at 09:17

## 2018-08-03 RX ADMIN — INSULIN ASPART 1 UNITS: 100 INJECTION, SOLUTION INTRAVENOUS; SUBCUTANEOUS at 12:50

## 2018-08-03 RX ADMIN — INSULIN DETEMIR 15 UNITS: 100 INJECTION, SOLUTION SUBCUTANEOUS at 21:43

## 2018-08-03 RX ADMIN — BUPROPION HYDROCHLORIDE 150 MG: 150 TABLET, EXTENDED RELEASE ORAL at 17:19

## 2018-08-03 RX ADMIN — GABAPENTIN 800 MG: 400 CAPSULE ORAL at 21:44

## 2018-08-03 RX ADMIN — ISOSORBIDE MONONITRATE 30 MG: 30 TABLET ORAL at 21:44

## 2018-08-03 RX ADMIN — METOPROLOL TARTRATE 50 MG: 50 TABLET, FILM COATED ORAL at 21:44

## 2018-08-03 RX ADMIN — GABAPENTIN 800 MG: 400 CAPSULE ORAL at 17:15

## 2018-08-03 RX ADMIN — INSULIN ASPART 5 UNITS: 100 INJECTION, SOLUTION INTRAVENOUS; SUBCUTANEOUS at 12:51

## 2018-08-03 RX ADMIN — ASPIRIN 81 MG: 81 TABLET, CHEWABLE ORAL at 09:18

## 2018-08-03 RX ADMIN — AMLODIPINE BESYLATE 10 MG: 10 TABLET ORAL at 21:44

## 2018-08-03 RX ADMIN — METOPROLOL TARTRATE 50 MG: 50 TABLET, FILM COATED ORAL at 09:18

## 2018-08-03 RX ADMIN — NAPROXEN 500 MG: 500 TABLET ORAL at 17:20

## 2018-08-03 RX ADMIN — INSULIN ASPART 4 UNITS: 100 INJECTION, SOLUTION INTRAVENOUS; SUBCUTANEOUS at 17:21

## 2018-08-03 RX ADMIN — ARIPIPRAZOLE 10 MG: 10 TABLET ORAL at 09:18

## 2018-08-03 RX ADMIN — INSULIN ASPART 5 UNITS: 100 INJECTION, SOLUTION INTRAVENOUS; SUBCUTANEOUS at 17:22

## 2018-08-03 RX ADMIN — GABAPENTIN 800 MG: 400 CAPSULE ORAL at 09:18

## 2018-08-03 RX ADMIN — CLOPIDOGREL 75 MG: 75 TABLET, FILM COATED ORAL at 09:18

## 2018-08-03 RX ADMIN — HYDROCHLOROTHIAZIDE 12.5 MG: 12.5 CAPSULE, GELATIN COATED ORAL at 09:17

## 2018-08-03 NOTE — PROGRESS NOTES
"     LOS: 1 day   Primary Care Physician: Hector Araya APRN     Subjective   Feels okay.  Left hip is a little better today but just got out of a hot shower earlier.    Vital Signs  Body mass index is 27.37 kg/m².  Temp:  [97.7 °F (36.5 °C)] 97.7 °F (36.5 °C)  Heart Rate:  [79] 79  Resp:  [20] 20  BP: (141)/(103) 141/103      Objective:  General Appearance:  In no acute distress.    Vital signs: (most recent): Blood pressure (!) 141/103, pulse 79, temperature 97.7 °F (36.5 °C), temperature source Oral, resp. rate 20, height 172.7 cm (68\"), weight 81.6 kg (180 lb), SpO2 100 %.    Abdomen: Abdomen is non-distended.    Extremities: There is no dependent edema.    Neurological: Patient is alert.    Skin:  Warm and dry.  No rash.         Results Review:    I reviewed the patient's new clinical results.      Results from last 7 days  Lab Units 08/02/18 2005   WBC 10*3/mm3 7.82   HEMOGLOBIN g/dL 14.3   PLATELETS 10*3/mm3 232       Results from last 7 days  Lab Units 08/02/18  0618   SODIUM mmol/L 138   POTASSIUM mmol/L 4.0   CHLORIDE mmol/L 97*   CO2 mmol/L 19.5*   BUN mg/dL 13   CREATININE mg/dL 1.00   CALCIUM mg/dL 9.3   GLUCOSE mg/dL 329*         Hemoglobin A1C:No results found for: HGBA1C    Glucose Range:  Glucose   Date/Time Value Ref Range Status   08/03/2018 0746 135 (H) 70 - 130 mg/dL Final   08/02/2018 2045 304 (H) 70 - 130 mg/dL Final   08/02/2018 1649 236 (H) 70 - 130 mg/dL Final   08/02/2018 1030 232 (H) 70 - 130 mg/dL Final   08/02/2018 0304 239 (H) 70 - 130 mg/dL Final   08/02/2018 0059 248 (H) 70 - 130 mg/dL Final       No results found for: [id number]    Lab Results   Component Value Date    TSH 2.970 08/02/2018       Assessment & Plan      Medication Review: Yes    Active Hospital Problems    Diagnosis Date Noted   • Depression with suicidal ideation [F32.9, R45.851] 08/02/2018      Resolved Hospital Problems    Diagnosis Date Noted Date Resolved   No resolved problems to display. "       Assessment/Plan  1.  Left hip pain.  Plain films did not show any significant findings.  He may have trochanteric bursitis.  Okay to continue Naprosyn for an additional 2 or 3 days.  If no improvement, discontinue.  He may want to talk to his primary care provider about a referral to ortho for steroid injection  2.  Diabetes mellitus type 2.  Glucose improved today.  He was restarted on long-acting insulin last night.  Continue with mealtime insulin as he does at home.  3.  Coronary artery disease, stable.  Continue medications  4.  Hypertension.  Continue home medications.    Marisol Subramanian MD  08/03/18  10:41 AM

## 2018-08-03 NOTE — SIGNIFICANT NOTE
Met with pt, wife Elisabeth and daughter Lula for family session [id number]. Family shared that session not attended by the son who has DX of Autism. Pt and daughter shared of recent depression related to pt falling while at work alone. Pt shared had hit the leg pt had had knee surgery. Pt associated depression to the state tax issue which caused pt to not have 's licensed. Family described pt actions when depressed included isolation to bed and not talking to family. Daughter then became tearful. When asked to shared of tears, daughter and wife shared how pt gets more upset and depressed drinking ETOH. Pt reported use of ETOH daily. Wife shared of anger about how pt drinking is causing more problems and not addressing problems. Pt shared could go back to NA meetings to help stop ETOH use. Pt then shared of guilt and shame keeping pt from calling sponsor or going to meetings and that the last meeting attended was in April of this year for a party. Pt reported history of addiction treatment at 3 to 4 different facilities in the past and does not want to go to any program for addiction. Pt then shared of transportation issue to get to a program since does not currently have a license.Daughter remained tearful and shared of felt pressure to tell pt what wife wants to say since pt reacted in anger to wife. Wife did ask pt what goals and plan pt does want to do. Pt responded with silence. Pt then stated IOP would be fine if had a license. When asked what emotion pt had with this discussion, pt stated desire to run and escape. Pt then shared desire to go back home and if drinks would agree to go to a program for addiction. This therapist warned of the possible consequences of not seeking help. Wife voiced the concern of pt hurting others or self as pt would drive drunk to get more ETOH. Wife and daughter then shared that pt is skilled in helping others with issues and confused that pt would not apply the help to self.  Wife offered to help with the tax issues.

## 2018-08-03 NOTE — PLAN OF CARE
Problem: Patient Care Overview  Goal: Discharge Needs Assessment  Outcome: Ongoing (interventions implemented as appropriate)   08/03/18 1026 08/03/18 1028   Discharge Needs Assessment   Readmission Within the Last 30 Days no previous admission in last 30 days --    Concerns to be Addressed medication;decision making;coping/stress;compliance issue;cognitive/perceptual;suicidal;substance/tobacco abuse/use;relationship;mental health --    Patient/Family Anticipates Transition to home with family --    Patient/Family Anticipated Services at Transition outpatient care --    Transportation Anticipated car, drives self;family or friend will provide --    Discharge Coordination/Progress --  Pt will return home with spouse at discharge. Pt will receive a 1:1 session and IRA consult. The need for family session with spouse will be assessed on an ongoing basis. SW will explore outpatient providers for mental health.

## 2018-08-03 NOTE — H&P
"IDENTIFYING INFORMATION: The patient is a 63-year-old -American male admitted voicing suicidal ideation.    CHIEF COMPLAINT:  None given    INFORMANT:  Patient and chart    RELIABILITY:  Good    HISTORY OF PRESENT ILLNESS: The patient is a 63-year-old  I[place] male admitted after he presented to the emergency room voicing positive suicidal elation.  The patient admits that he had been intoxicated when he made these threats.  Patient reports multiple stressors including the belief that his wife is involved in an extramarital affair and a tax bill of $8000 which has some concern.  The patient has history of previous hospitalization at [facility] following a suicide attempt several years ago.  He has a history of cocaine abuse but denies abuse of this substance at this time.  When seen today the patient continues to comply with his wife is involved in the next marital fair stating that he \"followed her when she was was to get her hair did\".  The patient denies recent changes in sleep or appetite.  He is followed at the [facility] related to history of depression.    PAST PSYCHIATRIC HISTORY: As above    PAST MEDICAL HISTORY:  Significant for coronary artery disease, hypertension and diabetes mellitus.  The patient is status post stent placement.    MEDICATIONS:   Current Facility-Administered Medications   Medication Dose Route Frequency Provider Last Rate Last Dose   • acetaminophen (TYLENOL) tablet 650 mg  650 mg Oral Q4H PRN Aron Hernandez III, MD       • aluminum-magnesium hydroxide-simethicone (MAALOX MAX) 400-400-40 MG/5ML suspension 15 mL  15 mL Oral Q6H PRN Aron Hernandez III, MD       • amLODIPine (NORVASC) tablet 10 mg  10 mg Oral Nightly Aron Hernandez III, MD       • ARIPiprazole (ABILIFY) tablet 10 mg  10 mg Oral Daily Aron Hernandez III, MD       • [START ON 8/4/2018] aspirin chewable tablet 81 mg  81 mg Oral Daily Moris, " MD Marisol       • [START ON 8/4/2018] clopidogrel (PLAVIX) tablet 75 mg  75 mg Oral Daily Marisol Subramanian MD       • dextrose (D50W) solution 25 g  25 g Intravenous Q15 Min PRN Aron Hernandez III, MD       • dextrose (D50W) solution 25 g  25 g Intravenous Q15 Min PRN Marisol Subramanian MD       • dextrose (GLUTOSE) oral gel 15 g  15 g Oral Q15 Min PRN Aron Hernandez III, MD       • dextrose (GLUTOSE) oral gel 15 g  15 g Oral Q15 Min PRN Marisol Subramanian MD       • gabapentin (NEURONTIN) capsule 800 mg  800 mg Oral TID Aron Hernandez III, MD       • glucagon (human recombinant) (GLUCAGEN DIAGNOSTIC) injection 1 mg  1 mg Subcutaneous PRN Aron Hernandez III, MD       • glucagon (human recombinant) (GLUCAGEN DIAGNOSTIC) injection 1 mg  1 mg Subcutaneous PRN Marisol Subramanian MD       • [START ON 8/4/2018] hydrochlorothiazide (MICROZIDE) capsule 12.5 mg  12.5 mg Oral Daily Marisol Subramanian MD       • insulin aspart (novoLOG) injection 0-9 Units  0-9 Units Subcutaneous 4x Daily With Meals & Nightly Marisol Subramanian MD       • insulin aspart (novoLOG) injection 5 Units  5 Units Subcutaneous TID With Meals Marisol Subramanian MD   5 Units at 08/02/18 1722   • insulin detemir (LEVEMIR) injection 15 Units  15 Units Subcutaneous Nightly Marisol Subramanian MD   15 Units at 08/02/18 2310   • isosorbide mononitrate (IMDUR) 24 hr tablet 30 mg  30 mg Oral Nightly Aron Hernandez III, MD       • lisinopril (PRINIVIL,ZESTRIL) tablet 5 mg  5 mg Oral Q24H Marisol Subramanian MD       • magnesium hydroxide (MILK OF MAGNESIA) suspension 2400 mg/10mL 10 mL  10 mL Oral Daily PRN Aron Hernandez III, MD       • metoprolol tartrate (LOPRESSOR) tablet 50 mg  50 mg Oral Q12H Marisol Subramanian MD   50 mg at 08/03/18 0918   • naproxen (NAPROSYN) tablet 500 mg  500 mg Oral BID With Meals Marisol Subramanian MD   500 mg at 08/03/18 0917         ALLERGIES:  Statins Ticagrelor hydralazine  carbonate I will    FAMILY HISTORY:  Noncontributory    SOCIAL HISTORY: The patient lives with his wife area he is studying online to obtain his PhD and is a .  He admits to abuse of alcohol.    MENTAL STATUS EXAM: The patient is a well-developed well-nourished -American male appearing his stated age.  He is no apparent physical distress of family examination.  He is awake alert and oriented all spheres.  His mood is euthymic his affect congruent.  Speech is generally relevant coherent.  There are no gross sepsis memory cognition noted.  Intelligence is judged to be average range based on fund of knowledge, the patient's cooperative throughout interview.  He denies current suicidal homicidal ideation or psychotic features.  Judgment and insight appear to be recently intact.    ASSETS/LIABILITIES: To be assessed    DIAGNOSTIC IMPRESSION: Major depressive disorder recurrent moderate, alcohol use disorder, coronary artery disease, diabetes mellitus, hypertension    PLAN:  The patient will continue previously prescribed medications.  At this point I will add Wellbutrin  mg daily in hopes of addressing the patient's complaints of depression.  A family therapy session has been ordered and discharged could take place as early as tomorrow if it goes well with follow-up to take place through the auspices of the [facility].

## 2018-08-03 NOTE — PLAN OF CARE
"Problem: Patient Care Overview  Goal: Plan of Care Review  Outcome: Ongoing (interventions implemented as appropriate)   08/03/18 0115 08/03/18 0534   OTHER   Outcome Summary --  Pt remains withdrawn to room and irritable. Pt states that his medications are \"all screwed up\" and \"you guys are gonna kill me in here\". When attempting to remedy the medications, the patient stopped responding and would only say \"I don't care\" or \"it doesn't matter\". Pt stated he needs his phone to look at the times he normally takes his medication. This RN offered to retreive his phone from [employer] so he may copy the times down on paper, and the patient did not respond to this. Remains 1-1 with NELSON. ROLO Poon SI. Contracts for safety. Will continue to monitor.    Plan of Care Review   Progress --  no change   Coping/Psychosocial   Patient Agreement with Plan of Care --  disagrees (describe)   Coping/Psychosocial   Plan of Care Reviewed With patient --        Problem: Overarching Goals (Adult)  Goal: Adheres to Safety Considerations for Self and Others  Outcome: Ongoing (interventions implemented as appropriate)   08/03/18 0534   Overarching Goals (Adult)   Adheres to Safety Considerations for Self and Others making progress toward outcome     Intervention: Develop and Maintain Individualized Safety Plan   08/03/18 0115 08/03/18 0400   C-SSRS (Recent)   Wish to be Dead no --    Suicidal Thoughts no --    Suicidal Thought with Method No Plan/Intent no --    Suicidal Intent (without Specific Plan) no --    Suicide Intent with Specific Plan no --    Describe Plan (Suicide Intent) no --    Suicide Behavior no --    Describe Actions (Suicidal Behavior) within the last three months --    Violence Risk   Feels Like Hurting Others no --    Previous Attempt to Harm Others no --    Develop and Maintain Individualized Safety Plan   Safety Measures --  safety rounds completed       Goal: Optimized Coping Skills in Response to Life Stressors  Outcome: " Ongoing (interventions implemented as appropriate)   08/03/18 0534   Overarching Goals (Adult)   Optimized Coping Skills in Response to Life Stressors making progress toward outcome     Intervention: Promote Effective Coping Strategies   08/03/18 0115   Coping/Psychosocial Interventions   Supportive Measures active listening utilized;verbalization of feelings encouraged       Goal: Develops/Participates in Therapeutic [place] to Support Successful Transition  Outcome: Ongoing (interventions implemented as appropriate)   08/03/18 0534   Overarching Goals (Adult)   Develops/Participates in Therapeutic [place] to Support Successful Transition making progress toward outcome     Intervention: Foster Therapeutic [place]   08/03/18 0115   Interventions   Trust Relationship/Rapport care explained;thoughts/feelings acknowledged     Intervention: Mutually Develop Transition Plan   08/03/18 0115   Mutually Develop Transition Plan   Transition Support crisis management plan promoted         Problem: Suicidal Behavior (Adult)  Goal: Suicidal Behavior is Absent/Minimized/Managed  Outcome: Ongoing (interventions implemented as appropriate)   08/03/18 0534   Suicidal Behavior is Absent/Minimized/Managed   Suicidal Behavior Managed/Minimized Time Frame for Action Step (STG) 3 days   Suicidal Behavior Managed/Minimized Action Step (STG) Outcome making progress toward outcome

## 2018-08-03 NOTE — PLAN OF CARE
"Problem: Patient Care Overview  Goal: Plan of Care Review  Outcome: Ongoing (interventions implemented as appropriate)   08/03/18 1650   OTHER   Outcome Summary Pt has been cooperative with programming. Has been out of room. Family session done. Has not mentioned anything about his meds or that we will \"kill him in here\".    Plan of Care Review   Progress improving   Coping/Psychosocial   Patient Agreement with Plan of Care agrees   Coping/Psychosocial   Plan of Care Reviewed With patient       Problem: Overarching Goals (Adult)  Goal: Adheres to Safety Considerations for Self and Others  Outcome: Ongoing (interventions implemented as appropriate)   08/03/18 1650   Overarching Goals (Adult)   Adheres to Safety Considerations for Self and Others making progress toward out   08/03/18 1650   Overarching Goals (Adult)   Adheres to Safety Considerations for Self and Others making progress toward outcome   come     Goal: Optimized Coping Skills in Response to Life Stressors  Outcome: Ongoing (interventions implemented as appropriate)   08/03/18 1650   Overarching Goals (Adult)   Optimized Coping Skills in Response to Life Stressors making progress toward outcome     Goal: Develops/Participates in Therapeutic [place] to Support Successful Transition  Outcome: Ongoing (interventions implemented as appropriate)   08/03/18 1650   Overarching Goals (Adult)   Develops/Participates in Therapeutic [place] to Support Successful Transition making progress toward outcome       Problem: Suicidal Behavior (Adult)  Goal: Suicidal Behavior is Absent/Minimized/Managed  Outcome: Ongoing (interventions implemented as appropriate)   08/03/18 1650   OTHER   Action Step/Short Term Goal (STG) Established 08/02/18   Suicidal Behavior is Absent/Minimized/Managed   Suicidal Behavior Managed/Minimized Time Frame for Action Step (STG) 3 days   Suicidal Behavior Managed/Minimized Action Step (STG) Outcome making progress toward outcome         "

## 2018-08-03 NOTE — PLAN OF CARE
Problem: Patient Care Overview  Goal: Individualization and Mutuality  Outcome: Ongoing (interventions implemented as appropriate)    Goal: Interprofessional Rounds/Family Conf  Outcome: Ongoing (interventions implemented as appropriate)   08/03/18 1028   Interdisciplinary Rounds/Family Conf   Participants ;social work;psychiatrist;art therapy;nursing;pharmacy   Interdisciplinary Rounds/Family Conf   Summary Treatment team met to create plan of care. Pt will receive a 1:1 session and IRA consult. The need for a family session will be assessed on an ongoing basis. SW will explore outpatient providers for mental health prior to D/C from [facility].      Patient/Guardian Signature: __________________________________            Psychiatrist Signature: ______________________________________             Therapist Signature: ________________________________________         Nurse Signature: ___________________________________________          Staff Signature: ____________________________________________            Staff Signature: ____________________________________________          Staff Signature: ____________________________________________          Staff Signature:                                                                                                      Problem: Suicidal Behavior (Adult)  Goal: Suicidal Behavior is Absent/Minimized/Managed  Outcome: Ongoing (interventions implemented as appropriate)

## 2018-08-04 LAB
CHOLEST SERPL-MCNC: 188 MG/DL (ref 0–200)
GLUCOSE BLDC GLUCOMTR-MCNC: 148 MG/DL (ref 70–130)
GLUCOSE BLDC GLUCOMTR-MCNC: 167 MG/DL (ref 70–130)
GLUCOSE BLDC GLUCOMTR-MCNC: 186 MG/DL (ref 70–130)
GLUCOSE BLDC GLUCOMTR-MCNC: 216 MG/DL (ref 70–130)
HDLC SERPL-MCNC: 48 MG/DL (ref 40–60)
LDLC SERPL CALC-MCNC: 110 MG/DL (ref 0–100)
LDLC/HDLC SERPL: 2.3 {RATIO}
TRIGL SERPL-MCNC: 148 MG/DL (ref 0–150)
VLDLC SERPL-MCNC: 29.6 MG/DL (ref 5–40)

## 2018-08-04 PROCEDURE — 80061 LIPID PANEL: CPT | Performed by: SPECIALIST

## 2018-08-04 PROCEDURE — 63710000001 INSULIN ASPART PER 5 UNITS: Performed by: INTERNAL MEDICINE

## 2018-08-04 PROCEDURE — 63710000001 INSULIN DETEMIR PER 5 UNITS: Performed by: INTERNAL MEDICINE

## 2018-08-04 PROCEDURE — 82962 GLUCOSE BLOOD TEST: CPT

## 2018-08-04 RX ADMIN — METOPROLOL TARTRATE 50 MG: 50 TABLET, FILM COATED ORAL at 08:05

## 2018-08-04 RX ADMIN — NAPROXEN 500 MG: 500 TABLET ORAL at 17:17

## 2018-08-04 RX ADMIN — ASPIRIN 81 MG: 81 TABLET, CHEWABLE ORAL at 08:05

## 2018-08-04 RX ADMIN — INSULIN ASPART 5 UNITS: 100 INJECTION, SOLUTION INTRAVENOUS; SUBCUTANEOUS at 12:10

## 2018-08-04 RX ADMIN — INSULIN ASPART 2 UNITS: 100 INJECTION, SOLUTION INTRAVENOUS; SUBCUTANEOUS at 12:09

## 2018-08-04 RX ADMIN — CLOPIDOGREL 75 MG: 75 TABLET, FILM COATED ORAL at 08:05

## 2018-08-04 RX ADMIN — INSULIN ASPART 4 UNITS: 100 INJECTION, SOLUTION INTRAVENOUS; SUBCUTANEOUS at 08:03

## 2018-08-04 RX ADMIN — HYDROCHLOROTHIAZIDE 12.5 MG: 12.5 CAPSULE, GELATIN COATED ORAL at 08:05

## 2018-08-04 RX ADMIN — GABAPENTIN 800 MG: 400 CAPSULE ORAL at 16:51

## 2018-08-04 RX ADMIN — GABAPENTIN 800 MG: 400 CAPSULE ORAL at 21:10

## 2018-08-04 RX ADMIN — GABAPENTIN 800 MG: 400 CAPSULE ORAL at 08:05

## 2018-08-04 RX ADMIN — ISOSORBIDE MONONITRATE 30 MG: 30 TABLET ORAL at 21:10

## 2018-08-04 RX ADMIN — INSULIN ASPART 5 UNITS: 100 INJECTION, SOLUTION INTRAVENOUS; SUBCUTANEOUS at 17:17

## 2018-08-04 RX ADMIN — INSULIN ASPART 2 UNITS: 100 INJECTION, SOLUTION INTRAVENOUS; SUBCUTANEOUS at 17:16

## 2018-08-04 RX ADMIN — METOPROLOL TARTRATE 50 MG: 50 TABLET, FILM COATED ORAL at 21:10

## 2018-08-04 RX ADMIN — BUPROPION HYDROCHLORIDE 150 MG: 150 TABLET, EXTENDED RELEASE ORAL at 08:04

## 2018-08-04 RX ADMIN — ARIPIPRAZOLE 10 MG: 10 TABLET ORAL at 08:05

## 2018-08-04 RX ADMIN — NAPROXEN 500 MG: 500 TABLET ORAL at 08:05

## 2018-08-04 RX ADMIN — AMLODIPINE BESYLATE 10 MG: 10 TABLET ORAL at 21:10

## 2018-08-04 RX ADMIN — INSULIN ASPART 6 UNITS: 100 INJECTION, SOLUTION INTRAVENOUS; SUBCUTANEOUS at 08:04

## 2018-08-04 RX ADMIN — INSULIN DETEMIR 15 UNITS: 100 INJECTION, SOLUTION SUBCUTANEOUS at 21:31

## 2018-08-04 NOTE — PROGRESS NOTES
Patient is seen, evaluated, and chart reviewed. Discussed with staff.  Patient is seen for Dr. Hernandez.    Staff reports that patient cooperative and compliant with medications.  He has been attending groups.  He had a family session that apparently did not go as well as hoped.    On examination, patient is found in the milieu.  Patient slept well last night.  He is alert and oriented ×4.  Appearance is appropriate.  Mood is depressed and anxious.  Affect congruent.  No SI/HI/AVH. Thought processes are mildly circumstantial.  Judgement and insight is fair.    No medication side effects.  He has tolerated Wellbutrin  mg. Patient is provided with supportive therapy.  He reports that his suicidal ideation upon admission was due to feeling overwhelmed and having consumed alcohol.  He has had no alcohol withdrawal.  He is currently on a 72 hour hold which expires August 7.  Continue current medications, therapy, and inpatient treatment plan for safety and stabilization.  Possible discharge Sunday or Monday.

## 2018-08-04 NOTE — PLAN OF CARE
Problem: Patient Care Overview  Goal: Plan of Care Review  Outcome: Ongoing (interventions implemented as appropriate)   08/04/18 1526   OTHER   Outcome Summary The patient rated his anxiety at a 2/10 and his depression at a 4/10. He denied any SI, HI, or hallucinations. He complained of left hip pain which he recieves scheduled Naproxen for and he reported minimal relife. Pleasent and out in the milleu today. Cooperative with medications. Atttending groups. Continuing to monitor.    Plan of Care Review   Progress improving   Coping/Psychosocial   Patient Agreement with Plan of Care agrees   Coping/Psychosocial   Plan of Care Reviewed With patient     Goal: Individualization and Mutuality  Outcome: Ongoing (interventions implemented as appropriate)    Goal: Discharge Needs Assessment  Outcome: Ongoing (interventions implemented as appropriate)    Goal: Interprofessional Rounds/Family Conf  Outcome: Ongoing (interventions implemented as appropriate)      Problem: Overarching Goals (Adult)  Goal: Adheres to Safety Considerations for Self and Others  Outcome: Ongoing (interventions implemented as appropriate)   08/04/18 1526   Overarching Goals (Adult)   Adheres to Safety Considerations for Self and Others making progress toward outcome     Intervention: Develop and Maintain Individualized Safety Plan   08/04/18 0850   C-SSRS (Recent)   Wish to be Dead no   Suicidal Thoughts no   Suicide Behavior no   Violence Risk   Feels Like Hurting Others no   Develop and Maintain Individualized Safety Plan   Safety Measures safety rounds completed;suicide assessment completed       Goal: Optimized Coping Skills in Response to Life Stressors  Outcome: Ongoing (interventions implemented as appropriate)   08/04/18 1526   Overarching Goals (Adult)   Optimized Coping Skills in Response to Life Stressors making progress toward outcome     Intervention: Promote Effective Coping Strategies   08/04/18 0850   Coping/Psychosocial  Interventions   Supportive Measures active listening utilized;relaxation techniques promoted;self-care encouraged;self-reflection promoted;self-responsibility promoted;verbalization of feelings encouraged       Goal: Develops/Participates in Therapeutic [place] to Support Successful Transition  Outcome: Ongoing (interventions implemented as appropriate)   08/04/18 1526   Overarching Goals (Adult)   Develops/Participates in Therapeutic [place] to Support Successful Transition making progress toward outcome     Intervention: Foster Therapeutic [place]   08/04/18 0850   Interventions   Trust Relationship/Rapport care explained;choices provided;emotional support provided;empathic listening provided;questions answered;questions encouraged;reassurance provided;thoughts/feelings acknowledged

## 2018-08-04 NOTE — PLAN OF CARE
Problem: Patient Care Overview  Goal: Plan of Care Review  Outcome: Ongoing (interventions implemented as appropriate)   08/04/18 0304 08/04/18 0521   OTHER   Outcome Summary --  Denied anxiety and SI/HI. Depression rated 4/10. Pt somewhat guarded at first, but more talkative after engaging in conversation. Cooperative and med compliant. Will continue to monitor and assess.    Plan of Care Review   Progress --  improving   Coping/Psychosocial   Patient Agreement with Plan of Care agrees --    Coping/Psychosocial   Plan of Care Reviewed With patient --        Problem: Overarching Goals (Adult)  Goal: Adheres to Safety Considerations for Self and Others  Outcome: Ongoing (interventions implemented as appropriate)    Goal: Optimized Coping Skills in Response to Life Stressors  Outcome: Ongoing (interventions implemented as appropriate)    Goal: Develops/Participates in Therapeutic [place] to Support Successful Transition  Outcome: Ongoing (interventions implemented as appropriate)      Problem: Suicidal Behavior (Adult)  Goal: Suicidal Behavior is Absent/Minimized/Managed  Outcome: Outcome(s) achieved Date Met: 08/04/18

## 2018-08-05 LAB
GLUCOSE BLDC GLUCOMTR-MCNC: 181 MG/DL (ref 70–130)
GLUCOSE BLDC GLUCOMTR-MCNC: 184 MG/DL (ref 70–130)
GLUCOSE BLDC GLUCOMTR-MCNC: 221 MG/DL (ref 70–130)
GLUCOSE BLDC GLUCOMTR-MCNC: 350 MG/DL (ref 70–130)

## 2018-08-05 PROCEDURE — 63710000001 INSULIN DETEMIR PER 5 UNITS: Performed by: INTERNAL MEDICINE

## 2018-08-05 PROCEDURE — 63710000001 INSULIN ASPART PER 5 UNITS: Performed by: INTERNAL MEDICINE

## 2018-08-05 PROCEDURE — 82962 GLUCOSE BLOOD TEST: CPT

## 2018-08-05 RX ORDER — TRAZODONE HYDROCHLORIDE 50 MG/1
100 TABLET ORAL NIGHTLY PRN
Status: DISCONTINUED | OUTPATIENT
Start: 2018-08-05 | End: 2018-08-06 | Stop reason: HOSPADM

## 2018-08-05 RX ADMIN — NAPROXEN 500 MG: 500 TABLET ORAL at 17:02

## 2018-08-05 RX ADMIN — ARIPIPRAZOLE 10 MG: 10 TABLET ORAL at 08:09

## 2018-08-05 RX ADMIN — INSULIN ASPART 5 UNITS: 100 INJECTION, SOLUTION INTRAVENOUS; SUBCUTANEOUS at 17:02

## 2018-08-05 RX ADMIN — GABAPENTIN 800 MG: 400 CAPSULE ORAL at 08:08

## 2018-08-05 RX ADMIN — METOPROLOL TARTRATE 50 MG: 50 TABLET, FILM COATED ORAL at 21:11

## 2018-08-05 RX ADMIN — TRAZODONE HYDROCHLORIDE 100 MG: 50 TABLET ORAL at 21:12

## 2018-08-05 RX ADMIN — NAPROXEN 500 MG: 500 TABLET ORAL at 08:09

## 2018-08-05 RX ADMIN — ISOSORBIDE MONONITRATE 30 MG: 30 TABLET ORAL at 21:11

## 2018-08-05 RX ADMIN — INSULIN ASPART 8 UNITS: 100 INJECTION, SOLUTION INTRAVENOUS; SUBCUTANEOUS at 12:05

## 2018-08-05 RX ADMIN — GABAPENTIN 800 MG: 400 CAPSULE ORAL at 16:12

## 2018-08-05 RX ADMIN — INSULIN ASPART 2 UNITS: 100 INJECTION, SOLUTION INTRAVENOUS; SUBCUTANEOUS at 08:10

## 2018-08-05 RX ADMIN — INSULIN ASPART 9 UNITS: 100 INJECTION, SOLUTION INTRAVENOUS; SUBCUTANEOUS at 12:05

## 2018-08-05 RX ADMIN — CLOPIDOGREL 75 MG: 75 TABLET, FILM COATED ORAL at 08:08

## 2018-08-05 RX ADMIN — GABAPENTIN 800 MG: 400 CAPSULE ORAL at 21:11

## 2018-08-05 RX ADMIN — INSULIN ASPART 2 UNITS: 100 INJECTION, SOLUTION INTRAVENOUS; SUBCUTANEOUS at 17:02

## 2018-08-05 RX ADMIN — BUPROPION HYDROCHLORIDE 150 MG: 150 TABLET, EXTENDED RELEASE ORAL at 08:08

## 2018-08-05 RX ADMIN — METOPROLOL TARTRATE 50 MG: 50 TABLET, FILM COATED ORAL at 08:08

## 2018-08-05 RX ADMIN — INSULIN DETEMIR 18 UNITS: 100 INJECTION, SOLUTION SUBCUTANEOUS at 22:04

## 2018-08-05 RX ADMIN — ASPIRIN 81 MG: 81 TABLET, CHEWABLE ORAL at 08:08

## 2018-08-05 RX ADMIN — AMLODIPINE BESYLATE 10 MG: 10 TABLET ORAL at 21:11

## 2018-08-05 RX ADMIN — INSULIN ASPART 5 UNITS: 100 INJECTION, SOLUTION INTRAVENOUS; SUBCUTANEOUS at 08:11

## 2018-08-05 RX ADMIN — HYDROCHLOROTHIAZIDE 12.5 MG: 12.5 CAPSULE, GELATIN COATED ORAL at 08:08

## 2018-08-05 RX ADMIN — INSULIN ASPART 4 UNITS: 100 INJECTION, SOLUTION INTRAVENOUS; SUBCUTANEOUS at 21:10

## 2018-08-05 NOTE — PROGRESS NOTES
"Patient is seen, evaluated, and chart reviewed. Discussed with staff.  Patient is seen for Dr. Hernandez.    Staff reports that patient has been compliant and cooperative with treatment.    On examination, patient is found in the group room.  Patient slept poorly last night.  Appearance is appropriate, pleasant.  Mood is \"better\" less depressed and anxious.  Affect congruent.  No SI/HI/AVH. Thought processes are goal-directed.  Judgement and insight is fair.    No medication side effects.  Patient is provided with supportive therapy.  I will add trazodone 100 mg as needed for sleep.  Continue current medications, therapy, and inpatient treatment plan for safety and stabilization.  Dr. Hernandez will resume care tomorrow.  Anticipate discharge home tomorrow.  "

## 2018-08-05 NOTE — PROGRESS NOTES
"     LOS: 3 days   Primary Care Physician: Hector Araya APRN     Subjective   Feels okay.  Has questions about labs.  Left hip is better.  At home he is on Lantus.  A1c in June is better than what it had been.    Vital Signs  Body mass index is 27.37 kg/m².  Temp:  [98.1 °F (36.7 °C)] 98.1 °F (36.7 °C)  Heart Rate:  [75] 75  Resp:  [18] 18  BP: (135)/(91) 135/91      Objective:  General Appearance:  In no acute distress.    Vital signs: (most recent): Blood pressure 135/91, pulse 75, temperature 98.1 °F (36.7 °C), temperature source Oral, resp. rate 18, height 172.7 cm (68\"), weight 81.6 kg (180 lb), SpO2 98 %.    Abdomen: Abdomen is non-distended.    Extremities: There is no dependent edema.    Neurological: Patient is alert.          Results Review:    I reviewed the patient's new clinical results.      Results from last 7 days  Lab Units 08/02/18 2005   WBC 10*3/mm3 7.82   HEMOGLOBIN g/dL 14.3   PLATELETS 10*3/mm3 232       Results from last 7 days  Lab Units 08/02/18  0618   SODIUM mmol/L 138   POTASSIUM mmol/L 4.0   CHLORIDE mmol/L 97*   CO2 mmol/L 19.5*   BUN mg/dL 13   CREATININE mg/dL 1.00   CALCIUM mg/dL 9.3   GLUCOSE mg/dL 329*         Hemoglobin A1C:No results found for: HGBA1C    Glucose Range:  Glucose   Date/Time Value Ref Range Status   08/05/2018 1148 350 (H) 70 - 130 mg/dL Final   08/05/2018 0801 181 (H) 70 - 130 mg/dL Final   08/04/2018 2018 148 (H) 70 - 130 mg/dL Final   08/04/2018 1645 186 (H) 70 - 130 mg/dL Final   08/04/2018 1152 167 (H) 70 - 130 mg/dL Final   08/04/2018 0749 216 (H) 70 - 130 mg/dL Final       No results found for: [id number]    Lab Results   Component Value Date    TSH 2.970 08/02/2018       Assessment & Plan      Medication Review: Yes    Active Hospital Problems    Diagnosis Date Noted   • Depression with suicidal ideation [F32.9, R45.851] 08/02/2018      Resolved Hospital Problems    Diagnosis Date Noted Date Resolved   No resolved problems to display. "       Assessment/Plan  1.  Diabetes mellitus type 2 with hyperglycemia.  I increase Levemir here.  Lantus will have a better 24 hour duration of action.  He will continue with that at home tomorrow and would like to use a sliding scale regimen similar to what he is on here.  2.  Left hip pain, improved.  Can change Naprosyn to Aleve 2 by mouth twice a day when necessary at home.  3.  Hypertension.  Numbers are acceptable.  Continue medications.  Outpatient follow-up    Total time 25 minutes with greater than half discussing management of diabetes and left hip pain with patient.  We will sign off.  Plans for discharge tomorrow noted.    Marisol Subramanian MD  08/05/18  2:42 PM

## 2018-08-05 NOTE — PLAN OF CARE
Problem: Patient Care Overview  Goal: Plan of Care Review  Outcome: Ongoing (interventions implemented as appropriate)   08/05/18 1416   OTHER   Outcome Summary The patient rated his anxiety at a 6/10 and he attributed it to being here on Sunday. He rated his depression at a 2/10. He complaine dof left hip pain at a 2/10 and recived scheduled Naproxen. he denied any SI, HI, or halluciantions. Pleasent and oun in milleu. Cooperative with medications. Attending groups. Continuing to monitor.    Plan of Care Review   Progress improving   Coping/Psychosocial   Patient Agreement with Plan of Care agrees   Coping/Psychosocial   Plan of Care Reviewed With patient     Goal: Individualization and Mutuality  Outcome: Ongoing (interventions implemented as appropriate)    Goal: Discharge Needs Assessment  Outcome: Ongoing (interventions implemented as appropriate)    Goal: Interprofessional Rounds/Family Conf  Outcome: Ongoing (interventions implemented as appropriate)      Problem: Overarching Goals (Adult)  Goal: Adheres to Safety Considerations for Self and Others  Outcome: Ongoing (interventions implemented as appropriate)   08/05/18 1416   Overarching Goals (Adult)   Adheres to Safety Considerations for Self and Others making progress toward outcome     Intervention: Develop and Maintain Individualized Safety Plan   08/04/18 2318 08/05/18 0735   Violence Risk   Feels Like Hurting Others --  no   Previous Attempt to Harm Others no --    C-SSRS (Recent)   Wish to be Dead --  no   Suicidal Thoughts --  no   Suicide Behavior --  no   Develop and Maintain Individualized Safety Plan   Safety Measures --  safety rounds completed;suicide assessment completed       Goal: Optimized Coping Skills in Response to Life Stressors  Outcome: Ongoing (interventions implemented as appropriate)   08/05/18 1416   Overarching Goals (Adult)   Optimized Coping Skills in Response to Life Stressors making progress toward outcome     Intervention:  Promote Effective Coping Strategies   08/05/18 0735   Coping/Psychosocial Interventions   Supportive Measures active listening utilized;relaxation techniques promoted;self-care encouraged;self-reflection promoted;self-responsibility promoted;verbalization of feelings encouraged       Goal: Develops/Participates in Therapeutic [place] to Support Successful Transition  Outcome: Ongoing (interventions implemented as appropriate)   08/05/18 1416   Overarching Goals (Adult)   Develops/Participates in Therapeutic [place] to Support Successful Transition making progress toward outcome     Intervention: Foster Therapeutic [place]   08/05/18 0735   Interventions   Trust Relationship/Rapport care explained;choices provided;emotional support provided;empathic listening provided;questions encouraged;questions answered;reassurance provided;thoughts/feelings acknowledged

## 2018-08-05 NOTE — PLAN OF CARE
Problem: Patient Care Overview  Goal: Plan of Care Review  Outcome: Ongoing (interventions implemented as appropriate)   08/04/18 2318 08/05/18 0326   OTHER   Outcome Summary --  Pt rated anxiety and depression both 2/10, stated both have improved greatly, out in miliue, talking to this nurse pt became tearful. Another pt reminded him of his mother and it was bringing back a lot of memories. Cooperative and compliant with medications and assessment. Will continue to monitor.   Plan of Care Review   Progress --  improving   Coping/Psychosocial   Patient Agreement with Plan of Care agrees --    Coping/Psychosocial   Plan of Care Reviewed With patient --        Problem: Overarching Goals (Adult)  Goal: Adheres to Safety Considerations for Self and Others  Outcome: Ongoing (interventions implemented as appropriate)   08/05/18 0326   Overarching Goals (Adult)   Adheres to Safety Considerations for Self and Others making progress toward outcome     Goal: Optimized Coping Skills in Response to Life Stressors  Outcome: Ongoing (interventions implemented as appropriate)   08/05/18 0326   Overarching Goals (Adult)   Optimized Coping Skills in Response to Life Stressors making progress toward outcome     Goal: Develops/Participates in Therapeutic [place] to Support Successful Transition  Outcome: Ongoing (interventions implemented as appropriate)   08/05/18 0326   Overarching Goals (Adult)   Develops/Participates in Therapeutic [place] to Support Successful Transition making progress toward outcome

## 2018-08-06 VITALS
WEIGHT: 180 LBS | HEIGHT: 68 IN | OXYGEN SATURATION: 96 % | SYSTOLIC BLOOD PRESSURE: 126 MMHG | HEART RATE: 63 BPM | TEMPERATURE: 97.1 F | BODY MASS INDEX: 27.28 KG/M2 | RESPIRATION RATE: 16 BRPM | DIASTOLIC BLOOD PRESSURE: 91 MMHG

## 2018-08-06 LAB
GLUCOSE BLDC GLUCOMTR-MCNC: 136 MG/DL (ref 70–130)
GLUCOSE BLDC GLUCOMTR-MCNC: 219 MG/DL (ref 70–130)

## 2018-08-06 PROCEDURE — 63710000001 INSULIN ASPART PER 5 UNITS: Performed by: INTERNAL MEDICINE

## 2018-08-06 PROCEDURE — 82962 GLUCOSE BLOOD TEST: CPT

## 2018-08-06 RX ORDER — ARIPIPRAZOLE 10 MG/1
10 TABLET ORAL DAILY
Qty: 30 TABLET | Refills: 1 | Status: SHIPPED | OUTPATIENT
Start: 2018-08-07

## 2018-08-06 RX ORDER — BUPROPION HYDROCHLORIDE 150 MG/1
150 TABLET ORAL DAILY
Qty: 30 TABLET | Refills: 1 | Status: SHIPPED | OUTPATIENT
Start: 2018-08-07

## 2018-08-06 RX ORDER — TRAZODONE HYDROCHLORIDE 100 MG/1
100 TABLET ORAL NIGHTLY PRN
Qty: 30 TABLET | Refills: 1 | Status: SHIPPED | OUTPATIENT
Start: 2018-08-06

## 2018-08-06 RX ADMIN — ASPIRIN 81 MG: 81 TABLET, CHEWABLE ORAL at 08:27

## 2018-08-06 RX ADMIN — CLOPIDOGREL 75 MG: 75 TABLET, FILM COATED ORAL at 08:27

## 2018-08-06 RX ADMIN — NAPROXEN 500 MG: 500 TABLET ORAL at 08:27

## 2018-08-06 RX ADMIN — GABAPENTIN 800 MG: 400 CAPSULE ORAL at 08:26

## 2018-08-06 RX ADMIN — ARIPIPRAZOLE 10 MG: 10 TABLET ORAL at 08:26

## 2018-08-06 RX ADMIN — METOPROLOL TARTRATE 50 MG: 50 TABLET, FILM COATED ORAL at 08:27

## 2018-08-06 RX ADMIN — BUPROPION HYDROCHLORIDE 150 MG: 150 TABLET, EXTENDED RELEASE ORAL at 08:27

## 2018-08-06 RX ADMIN — HYDROCHLOROTHIAZIDE 12.5 MG: 12.5 CAPSULE, GELATIN COATED ORAL at 08:27

## 2018-08-06 RX ADMIN — INSULIN ASPART 5 UNITS: 100 INJECTION, SOLUTION INTRAVENOUS; SUBCUTANEOUS at 08:26

## 2018-08-06 NOTE — DISCHARGE SUMMARY
DATES OF ADMISSION: 8/2/2018-8/6/2018    REASON FOR ADMISSION: The patient is a 63-year-old -American male admitted with increased alcohol use depressed mood and suicidal ideation.    LABS:  See chart    HOSPITAL COURSE:  Patient was  to the crisis management unit and placed on suicide precautions.  These were discontinued after the patient was able to promise safety in the hospital.  His stay in the hospital was an otherwise uneventful one he was continued on previously prescribed Abilify and Wellbutrin  mg daily was added.  The patient tolerated this medication change well.  The patient reported that his initial family session did not go particularly well but that subsequent visits were more convivial.  By 8/6/18 the patient was in brighter spirits and denied suicidal ideation.  He requested discharge it was so ordered.    FINAL DIAGNOSIS:  Major depressive disorder recurrent moderate, diabetes mellitus, status post MI, hypertension, coronary artery disease    DISPOSITION ON DISCHARGE:  A full listing of the patient's medications is provided below.  Follow-up will take place through the auspices of the [facility] and the [facility].    PROGNOSIS: Fair       Discharge Medications      New Medications      Instructions Start Date   buPROPion  MG 24 hr tablet  Commonly known as:  WELLBUTRIN XL   150 mg, Oral, Daily      traZODone 100 MG tablet  Commonly known as:  DESYREL   100 mg, Oral, Nightly PRN         Continue These Medications      Instructions Start Date   amLODIPine 10 MG tablet  Commonly known as:  NORVASC   10 mg, Oral, Daily      ARIPiprazole 10 MG tablet  Commonly known as:  ABILIFY   10 mg, Oral, Daily      aspirin 81 MG tablet   81 mg, Oral, Daily      clopidogrel 75 MG tablet  Commonly known as:  PLAVIX   75 mg, Oral, Daily      hydrochlorothiazide 25 MG tablet  Commonly known as:  HYDRODIURIL   12.5 mg, Oral, 2 Times Daily      insulin aspart  100 UNIT/ML injection  Commonly known as:  novoLOG   5 Units, Subcutaneous, 3 Times Daily Before Meals, Blood glucose less than 150, hold that dose of insulin, takes 5 units if blood glucose is 150-200; if above 200 takes 6 units, then adds 1 additional unit for each 50 point increase in BG.      insulin glargine 100 UNIT/ML injection  Commonly known as:  LANTUS   10 Units, Subcutaneous, Nightly      isosorbide mononitrate 30 MG 24 hr tablet  Commonly known as:  IMDUR   30 mg, Oral, Daily      lisinopril 5 MG tablet  Commonly known as:  PRINIVIL,ZESTRIL   5 mg, Oral, Daily      metoprolol tartrate 50 MG tablet  Commonly known as:  LOPRESSOR   50 mg, Oral, Every 12 Hours Scheduled         Stop These Medications    pravastatin 40 MG tablet  Commonly known as:  PRAVACHOL        ASK your doctor about these medications      Instructions Start Date   gabapentin 800 MG tablet  Commonly known as:  NEURONTIN   800 mg, Oral, 3 Times Daily      nitroglycerin 0.4 MG SL tablet  Commonly known as:  NITROSTAT   0.4 mg, Sublingual, Every 5 Minutes PRN

## 2018-08-06 NOTE — PROGRESS NOTES
Continued Stay Note  [facility]     Patient Name: Alfredo Rose  MRN: [id number]  Today's Date: 8/6/2018    Admit Date: 8/2/2018          Discharge Plan     Row Name 08/06/18 0923       Plan    Final Discharge Disposition Code 01 - home or self-care    Final Note Pt is discharging home, per Dr. Hernandez's orders. Pt has an assessment at the [facility] [phone] for outpatient substance use disorder treatment with clinician, Juan Horvath, on 08/08 at 2:00p. Pt will receive psychiatry for medication management and outpatient therapy services to address substance use disorder and mental health issues simultaneously at the [facility]. Pt's transportation is being provided by his daughter, Lula. Pt expressed some resistance and schedule conflict with after-care, due to reportedly not having his 's license. MICHAEL encouraged pt to attend appointment and if necessary, call to reschedule or cancel assessment appointment. MICHAEL also provided pt with [facility]. Pt completed PHQ-9 survey at discharge.               Discharge Codes    No documentation.           Nicole Izaguirre LCSW

## 2018-08-06 NOTE — PLAN OF CARE
Problem: Patient Care Overview  Goal: Plan of Care Review  Outcome: Ongoing (interventions implemented as appropriate)   08/05/18 1416 08/05/18 2241 08/06/18 0317   OTHER   Outcome Summary --  --  Pt out in dayroom majority of shift laying on couch and watching tv. Cooperative and compliant with medications, denied all issues, no thoughts to hurt self or others. Stated he was looking forward to going home tomorrow (Monday) and continuing his goals to get better, stop drinking, and take more time for himself. Will continue to monitor.   Plan of Care Review   Progress improving --  --    Coping/Psychosocial   Patient Agreement with Plan of Care --  agrees --    Coping/Psychosocial   Plan of Care Reviewed With --  patient --        Problem: Overarching Goals (Adult)  Goal: Adheres to Safety Considerations for Self and Others  Outcome: Ongoing (interventions implemented as appropriate)   08/06/18 0317   Overarching Goals (Adult)   Adheres to Safety Considerations for Self and Others making progress toward outcome     Goal: Optimized Coping Skills in Response to Life Stressors  Outcome: Ongoing (interventions implemented as appropriate)   08/06/18 0317   Overarching Goals (Adult)   Optimized Coping Skills in Response to Life Stressors making progress toward outcome     Goal: Develops/Participates in Therapeutic [place] to Support Successful Transition  Outcome: Ongoing (interventions implemented as appropriate)   08/06/18 0317   Overarching Goals (Adult)   Develops/Participates in Therapeutic [place] to Support Successful Transition making progress toward outcome

## 2018-08-07 ENCOUNTER — TELEPHONE (OUTPATIENT)
Dept: PSYCHIATRY | Facility: HOSPITAL | Age: 63
End: 2018-08-07

## 2018-08-07 NOTE — TELEPHONE ENCOUNTER
"Patient reports he is doing \"fine.\"  He states he will  his medications today and understands all his discharge instructions.  He was complimentary of staff and no further assistance was requested.  "